# Patient Record
Sex: MALE | Race: WHITE | NOT HISPANIC OR LATINO | Employment: OTHER | URBAN - METROPOLITAN AREA
[De-identification: names, ages, dates, MRNs, and addresses within clinical notes are randomized per-mention and may not be internally consistent; named-entity substitution may affect disease eponyms.]

---
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
## 2025-01-06 ENCOUNTER — PRE-ADMISSION TESTING (OUTPATIENT)
Dept: PREADMISSION TESTING | Facility: HOSPITAL | Age: 20
End: 2025-01-06
Payer: COMMERCIAL

## 2025-02-03 DIAGNOSIS — K02.61 DENTAL CARIES ON SMOOTH SURFACE LIMITED TO ENAMEL: Primary | ICD-10-CM

## 2025-02-05 ENCOUNTER — APPOINTMENT (OUTPATIENT)
Dept: BEHAVIORAL HEALTH | Facility: CLINIC | Age: 20
End: 2025-02-05
Payer: COMMERCIAL

## 2025-02-12 ENCOUNTER — APPOINTMENT (OUTPATIENT)
Dept: BEHAVIORAL HEALTH | Facility: CLINIC | Age: 20
End: 2025-02-12
Payer: COMMERCIAL

## 2025-02-12 DIAGNOSIS — T88.7XXA MEDICATION SIDE EFFECT: ICD-10-CM

## 2025-02-12 DIAGNOSIS — F41.1 GAD (GENERALIZED ANXIETY DISORDER): ICD-10-CM

## 2025-02-12 DIAGNOSIS — F84.0 AUTISM SPECTRUM DISORDER (HHS-HCC): Primary | ICD-10-CM

## 2025-02-12 PROCEDURE — 99214 OFFICE O/P EST MOD 30 MIN: CPT | Performed by: PSYCHIATRY & NEUROLOGY

## 2025-02-12 RX ORDER — METFORMIN HYDROCHLORIDE 500 MG/1
500 TABLET ORAL
Qty: 30 TABLET | Refills: 3 | Status: SHIPPED | OUTPATIENT
Start: 2025-02-12 | End: 2025-06-12

## 2025-02-12 RX ORDER — BUSPIRONE HYDROCHLORIDE 30 MG/1
15 TABLET ORAL 3 TIMES DAILY
Qty: 45 TABLET | Refills: 3 | Status: SHIPPED | OUTPATIENT
Start: 2025-02-12 | End: 2025-06-12

## 2025-02-12 NOTE — PROGRESS NOTES
"Outpatient Child and Adolescent Psychiatry      Subjective   Jamar Abdalla, a 19 y.o. male, for medication management follow up. Patient seen virtually, accompanied by father.    Chief Complaint:  Chief Complaint   Patient presents with    Anxiety    Autism        HPI:     Since last visit, Jamar reports he has been fine and dad agrees. Jamar likes working at the [employer] with dad, which did not have to move after all. He likes spending time at the barn. Continues to use curse words in the course of daily conversation. Mood has been, \"Pretty good; in the past few months, I've only blown up twice.\" Once, it was because their delivery pizza did not show up on time and he can't recall the other trigger. Very hungry. Eats fast food regularly. Generally able to handle frustration better. Jamar will get wisdom teeth out in a few weeks. Sees PMD regularly. Aside from robust appetite, no side effects. No SI or HI.     Wt: 220#  Ht: 71\"     Past med trials:  concerta: helpful but may have worsened irritability  Sertraline seemed helpful at first, but caused activation, aggression at higher doses  tenex worsened aggression and oppositionality  ritalin worked then stopped  clonidine \"knocked him out, he ran into walls and fell asleep on his feet\"  never tried seroquel, abilify     Depression: Denies   Appetite: robust  Sleep: unchanged  Anxiety: Denies    Rosanna: None  Attention: fair  Impulse control and behavioral concerns: improving  Trauma/Stressors: Denies  OCD: Denies  Perceptual disturbances and delusions: Denies  Substance use: Denies  Denies suicidal or homicidal ideations, plan or intent    There were no vitals filed for this visit.     Mental Status Exam:  Appearance: 19 y.o. male sitting comfortably in chair during interview. Casually dressed. Appropriate hygiene and grooming. Appears heavier than last visit.   Behavior: Calm, cooperative, talkative, difficult to interrupt, swears regularly, intermittent eye " "contact. No abnormal motor activity observed.  Speech: Loud volume, halting prosody.   Cognitive: Sustains attention and conversation throughout interview; grossly oriented to time, self, place, and situation; recent and remote recall are intact  Mood: “It's fine, except when people piss me off\"  Affect: Euthymic   Thought process: concrete  Thought Content: No suicidal ideation/intent/plan. No homicidal ideation/intent/plan.  Perception: Denies auditory and visual hallucinations. No internal stimulation observed. Reality testing is ostensibly intact during interview.  Insight: fair  Judgment: fair    Current Medications:    Current Outpatient Medications:     busPIRone (Buspar) 30 mg tablet, Take 0.5 tablets (15 mg) by mouth 3 times a day., Disp: 45 tablet, Rfl: 3    ferrous sulfate, 325 mg ferrous sulfate, tablet, Take 1 tablet by mouth once daily with breakfast., Disp: , Rfl:     hydrOXYzine HCL (Atarax) 10 mg tablet, TAKE 1 TABLET 2-3 TIMES DAILY AS NEEDED FOR PANIC, Disp: 270 tablet, Rfl: 1    hydrOXYzine HCL (Atarax) 25 mg tablet, TAKE 1 TABLET (25 MG) BY MOUTH 3 TIMES A DAY AS NEEDED FOR ANXIETY., Disp: 270 tablet, Rfl: 1    metFORMIN (Glucophage) 500 mg tablet, Take 1 tablet (500 mg) by mouth once daily in the evening. Take with meals. Take with food., Disp: 30 tablet, Rfl: 3    omeprazole (PriLOSEC) 20 mg DR capsule, TAKE 1 CAPSULE BY MOUTH EVERY DAY, Disp: 90 capsule, Rfl: 1    risperiDONE (RisperDAL) 1 mg tablet, Take 1 tablet (1 mg) by mouth 2 times a day., Disp: 180 tablet, Rfl: 1      Assessment/Plan   Diagnosis:  Problem List Items Addressed This Visit             ICD-10-CM    Autism spectrum disorder ([facility]) - Primary F84.0    Relevant Orders    Follow Up In Pediatric Psychiatry    ANA (generalized anxiety disorder) F41.1    Relevant Medications    busPIRone (Buspar) 30 mg tablet    Other Relevant Orders    Follow Up In Pediatric Psychiatry     Other Visit Diagnoses         Codes    Medication side " effect     T88.7XXA    Relevant Medications    metFORMIN (Glucophage) 500 mg tablet           Treatment Plan/Recommendations:     1) Discussed options and decided to decrease risperidone to 0.5mg each morning and 1 mg each afternoon for irritability, aggression since you are doing well and your appetite remains robust; reordered labs  2) Continue metformin 500 mg once daily (higher dose caused GI distress)   3) Continue buspirone 15 mg three times daily for anxiety  4) Continue hydroxyzine 25 mg twice daily as needed for anxiety  5) Continue melatonin er 2.5-5mg as needed for insomnia, increase daytime exercise  6) Continue psychosocial supports, CSS supports, activities through [facility]  7) Nice to see you and please come back in 3-4 months    Follow-up plan for psychiatric condition was discussed with patient and family  Take medication as prescribed; risks, benefits and alternatives of medication were explained, including but not limited to changes in mood, sleep, appetite, increased risks of suicidal ideations, etc. Family and patient verbalized understanding and provided verbal consent for treatment  Therapy: Continue therapy services  Call 911 or go to the nearest emergency room should suicidal ideations emerge  Patient instructed to call the office should new questions or concerns arise after office visit    Safety Risk Assessment:   Acute risk for harm to self/others: low  Chronic risk for harm to self/others: low    Problem List Items Addressed This Visit       Autism spectrum disorder ([facility]) - Primary    Relevant Orders    Follow Up In Pediatric Psychiatry    ANA (generalized anxiety disorder)    Relevant Medications    busPIRone (Buspar) 30 mg tablet    Other Relevant Orders    Follow Up In Pediatric Psychiatry     Other Visit Diagnoses       Medication side effect        Relevant Medications    metFORMIN (Glucophage) 500 mg tablet             Follow-up:    Dulce Baugh MD

## 2025-02-18 ENCOUNTER — ANESTHESIA EVENT (OUTPATIENT)
Dept: OPERATING ROOM | Facility: CLINIC | Age: 20
End: 2025-02-18
Payer: COMMERCIAL

## 2025-02-21 ENCOUNTER — PRE-ADMISSION TESTING (OUTPATIENT)
Dept: PREADMISSION TESTING | Facility: HOSPITAL | Age: 20
End: 2025-02-21
Payer: COMMERCIAL

## 2025-02-21 VITALS
OXYGEN SATURATION: 99 % | TEMPERATURE: 97.5 F | SYSTOLIC BLOOD PRESSURE: 133 MMHG | DIASTOLIC BLOOD PRESSURE: 81 MMHG | BODY MASS INDEX: 29.61 KG/M2 | WEIGHT: 218.6 LBS | HEART RATE: 83 BPM | HEIGHT: 72 IN

## 2025-02-21 DIAGNOSIS — R25.1 TREMOR: Primary | ICD-10-CM

## 2025-02-21 PROCEDURE — 99204 OFFICE O/P NEW MOD 45 MIN: CPT | Performed by: NURSE PRACTITIONER

## 2025-02-21 ASSESSMENT — DUKE ACTIVITY SCORE INDEX (DASI)
CAN YOU WALK INDOORS, SUCH AS AROUND YOUR HOUSE: YES
CAN YOU TAKE CARE OF YOURSELF (EAT, DRESS, BATHE, OR USE TOILET): YES
TOTAL_SCORE: 58.2
CAN YOU CLIMB A FLIGHT OF STAIRS OR WALK UP A HILL: YES
CAN YOU RUN A SHORT DISTANCE: YES
CAN YOU DO HEAVY WORK AROUND THE HOUSE LIKE SCRUBBING FLOORS OR LIFTING AND MOVING HEAVY FURNITURE: YES
CAN YOU DO MODERATE WORK AROUND THE HOUSE LIKE VACUUMING, SWEEPING FLOORS OR CARRYING GROCERIES: YES
DASI METS SCORE: 9.9
CAN YOU WALK A BLOCK OR TWO ON LEVEL GROUND: YES
CAN YOU HAVE SEXUAL RELATIONS: YES
CAN YOU DO YARD WORK LIKE RAKING LEAVES, WEEDING OR PUSHING A MOWER: YES
CAN YOU DO LIGHT WORK AROUND THE HOUSE LIKE DUSTING OR WASHING DISHES: YES
CAN YOU PARTICIPATE IN STRENOUS SPORTS LIKE SWIMMING, SINGLES TENNIS, FOOTBALL, BASKETBALL, OR SKIING: YES
CAN YOU PARTICIPATE IN MODERATE RECREATIONAL ACTIVITIES LIKE GOLF, BOWLING, DANCING, DOUBLES TENNIS OR THROWING A BASEBALL OR FOOTBALL: YES

## 2025-02-21 ASSESSMENT — ENCOUNTER SYMPTOMS
EYES NEGATIVE: 1
AGITATION: 1
NEUROLOGICAL NEGATIVE: 1
MUSCULOSKELETAL NEGATIVE: 1
CARDIOVASCULAR NEGATIVE: 1
RESPIRATORY NEGATIVE: 1
CONSTITUTIONAL NEGATIVE: 1
ENDOCRINE NEGATIVE: 1
GASTROINTESTINAL NEGATIVE: 1
NECK NEGATIVE: 1

## 2025-02-21 ASSESSMENT — LIFESTYLE VARIABLES: SMOKING_STATUS: NONSMOKER

## 2025-02-21 NOTE — CPM/PAT H&P
CPM/PAT Evaluation       Name: Jamar Abdalla (Jamar Abdalla)  /Age: 2005/ y.o.     Visit Type:   In-Person       Chief Complaint: perioperative evaluation      HPI  The patient is a 19 year old male with history of autism and ANA who presents for perioperative evaluation in anticipation of  EXTRACTION, TOOTH - Bilateral on 25 with Dr. Morales.    Past Medical History:   Diagnosis Date    Anxiety     Attention-deficit hyperactivity disorder, combined type     ADHD (attention deficit hyperactivity disorder), combined type    Crushing injury of unspecified finger(s), initial encounter 2014    Crush injury to finger    Depression     Developmental delay     Dysphagia, unspecified 2016    Swallowing difficulty    Encounter for removal of sutures 2018    Encounter for removal of sutures    Encounter for routine child health examination without abnormal findings 2015    Well child visit    Gastroparesis 2016    Gastroparesis    Other feeding difficulties 2017    Feeding difficulty in child    Personal history of diseases of the blood and blood-forming organs and certain disorders involving the immune mechanism 2016    History of iron deficiency anemia    Personal history of diseases of the blood and blood-forming organs and certain disorders involving the immune mechanism     History of anemia    Personal history of other diseases of the respiratory system     History of pharyngitis    Personal history of other diseases of the respiratory system     History of streptococcal pharyngitis    Pervasive developmental disorder, unspecified     PDD (pervasive developmental disorder)       Past Surgical History:   Procedure Laterality Date    NO PAST SURGERIES         Patient  has no history on file for sexual activity.    Family History   Problem Relation Name Age of Onset    Bipolar disorder Mother Shakila Abdalla     Irritable bowel syndrome Mother Shakila Abdalla      Sleep apnea Mother Shakila Abdalla     Asthma Mother Shakila Abdalla     Cancer Mother Shakila Abdalla     Mental illness Mother Shakila Abdalla     Miscarriages / Stillbirths Mother Shakila Abdalla     Sarcoidosis Father Jordi Abdalla     Scoliosis Father Jordi Abdalla     Other (PITUITARY TUMOR) Father Jordi Abdalla     Atrial fibrillation Father Jordi Abdalla     Diabetes Father Jordi Abdalla     Heart disease Father Jordi Abdalla     Other (SUBSTANCE ABUSE) Brother Ben Abdalla     Drug abuse Brother Ben Abdalla     Diabetes Other GRANDFATHER     Anemia Other GRANDFATHER        Allergies   Allergen Reactions    Egg Other     BLOATING      Sertraline Other     aggression    Whey Protein Concentrate Other     BLOATING       Prior to Admission medications    Medication Sig Start Date End Date Taking? Authorizing Provider   busPIRone (Buspar) 30 mg tablet Take 0.5 tablets (15 mg) by mouth 3 times a day. 2/12/25 6/12/25  Dulce Baugh MD   ferrous sulfate, 325 mg ferrous sulfate, tablet Take 1 tablet by mouth once daily with breakfast.    Historical Provider, MD   hydrOXYzine HCL (Atarax) 10 mg tablet TAKE 1 TABLET 2-3 TIMES DAILY AS NEEDED FOR PANIC 12/18/23   Dulce Baugh MD   hydrOXYzine HCL (Atarax) 25 mg tablet TAKE 1 TABLET (25 MG) BY MOUTH 3 TIMES A DAY AS NEEDED FOR ANXIETY. 9/13/24 3/12/25  Dulce Baugh MD   metFORMIN (Glucophage) 500 mg tablet Take 1 tablet (500 mg) by mouth once daily in the evening. Take with meals. Take with food. 2/12/25 6/12/25  Dulce Baugh MD   omeprazole (PriLOSEC) 20 mg DR capsule TAKE 1 CAPSULE BY MOUTH EVERY DAY 12/16/24   John Marsh MD   risperiDONE (RisperDAL) 1 mg tablet Take 1 tablet (1 mg) by mouth 2 times a day. 9/25/24 3/24/25  Dulce Baugh MD        PAT ROS:   Constitutional:   neg    Neuro/Psych:   neg     agitation  Eyes:   neg    Ears:   neg    Nose:   neg    Mouth:   neg     mouth pain  Throat:   neg    Neck:   neg     Cardio:   neg    Respiratory:   neg    Endocrine:   neg    GI:   neg    :   neg    Musculoskeletal:   neg    Hematologic:   neg    Skin:  neg        Physical Exam  Vitals reviewed.   Constitutional:       Appearance: Normal appearance.   HENT:      Head: Normocephalic and atraumatic.      Nose: Nose normal.      Mouth/Throat:      Mouth: Mucous membranes are moist.      Pharynx: Oropharynx is clear.   Eyes:      Extraocular Movements: Extraocular movements intact.      Pupils: Pupils are equal, round, and reactive to light.   Cardiovascular:      Rate and Rhythm: Normal rate and regular rhythm.      Pulses: Normal pulses.      Heart sounds: Normal heart sounds.   Pulmonary:      Effort: Pulmonary effort is normal.      Breath sounds: Normal breath sounds.   Musculoskeletal:         General: Normal range of motion.      Cervical back: Normal range of motion.   Skin:     General: Skin is warm and dry.   Neurological:      General: No focal deficit present.      Mental Status: He is alert and oriented to person, place, and time.   Psychiatric:         Mood and Affect: Mood normal.      Comments: Easily agitated          PAT AIRWAY:   Airway:     Mallampati::  IV    TM distance::  >3 FB    Neck ROM::  Full  normal        Visit Vitals  /81   Pulse 83   Temp 36.4 °C (97.5 °F)   Ht 1.829 m (6')   Wt 99.2 kg (218 lb 9.6 oz)   SpO2 99%   BMI 29.65 kg/m²   Smoking Status Never   BSA 2.24 m²       DASI Risk Score      Flowsheet Row Pre-Admission Testing from 2/21/2025 in [facility] Questionnaire Series Submission from 12/30/2024 in [facility] with Generic Provider Azalia   Can you take care of yourself (eat, dress, bathe, or use toilet)?  2.75 filed at 02/21/2025 0833 2.75  filed at 12/30/2024 1019   Can you walk indoors, such as around your house? 1.75 filed at 02/21/2025 0833 1.75  filed at 12/30/2024 1019   Can you walk a block or two on level ground?  2.75 filed at 02/21/2025 0833 2.75  filed  at 12/30/2024 1019   Can you climb a flight of stairs or walk up a hill? 5.5 filed at 02/21/2025 0833 5.5  filed at 12/30/2024 1019   Can you run a short distance? 8 filed at 02/21/2025 0833 8  filed at 12/30/2024 1019   Can you do light work around the house like dusting or washing dishes? 2.7 filed at 02/21/2025 0833 2.7  filed at 12/30/2024 1019   Can you do moderate work around the house like vacuuming, sweeping floors or carrying groceries? 3.5 filed at 02/21/2025 0833 3.5  filed at 12/30/2024 1019   Can you do heavy work around the house like scrubbing floors or lifting and moving heavy furniture?  8 filed at 02/21/2025 0833 8  filed at 12/30/2024 1019   Can you do yard work like raking leaves, weeding or pushing a mower? 4.5 filed at 02/21/2025 0833 4.5  filed at 12/30/2024 1019   Can you have sexual relations? 5.25 filed at 02/21/2025 0833 5.25  filed at 12/30/2024 1019   Can you participate in moderate recreational activities like golf, bowling, dancing, doubles tennis or throwing a baseball or football? 6 filed at 02/21/2025 0833 6  filed at 12/30/2024 1019   Can you participate in strenous sports like swimming, singles tennis, football, basketball, or skiing? 7.5 filed at 02/21/2025 0833 7.5  filed at 12/30/2024 1019   DASI SCORE 58.2 filed at 02/21/2025 0833 58.2  filed at 12/30/2024 1019   METS Score (Will be calculated only when all the questions are answered) 9.9 filed at 02/21/2025 0833 9.9  filed at 12/30/2024 1019          Capterreneci DVT Assessment      Flowsheet Row Pre-Admission Testing from 2/21/2025 in [facility]   DVT Score (IF A SCORE IS NOT CALCULATING, MUST SELECT A BMI TO COMPLETE) 2 filed at 02/21/2025 0857   Surgical Factors Minor surgery planned filed at 02/21/2025 0857   BMI (BMI MUST BE CHOSEN) 30 or less filed at 02/21/2025 0857          Modified Frailty Index      Flowsheet Row Pre-Admission Testing from 2/21/2025 in [facility]   Non-independent  functional status (problems with dressing, bathing, personal grooming, or cooking) 0 filed at 02/21/2025 0856   History of diabetes mellitus  0 filed at 02/21/2025 0856   History of COPD 0 filed at 02/21/2025 0856   History of CHF No filed at 02/21/2025 0856   History of MI 0 filed at 02/21/2025 0856   History of Percutaneous Coronary Intervention, Cardiac Surgery, or Angina No filed at 02/21/2025 0856   Hypertension requiring the use of medication  0 filed at 02/21/2025 0856   Peripheral vascular disease 0 filed at 02/21/2025 0856   Impaired sensorium (cognitive impairement or loss, clouding, or delirium) 0 filed at 02/21/2025 0856   TIA or CVA withouy residual deficit 0 filed at 02/21/2025 0856   Cerebrovascular accident with deficit 0 filed at 02/21/2025 0856   Modified Frailty Index Calculator 0 filed at 02/21/2025 0856          CHADS2 Stroke Risk         N/A 3 to 100%: High Risk   2 to < 3%: Medium Risk   0 to < 2%: Low Risk     Last Change: N/A          This score determines the patient's risk of having a stroke if the patient has atrial fibrillation.        This score is not applicable to this patient. Components are not calculated.          Revised Cardiac Risk Index      Flowsheet Row Pre-Admission Testing from 2/21/2025 in [facility]   High-Risk Surgery (Intraperitoneal, Intrathoracic,Suprainguinal vascular) 0 filed at 02/21/2025 0856   History of ischemic heart disease (History of MI, History of positive exercuse test, Current chest paint considered due to myocardial ischemia, Use of nitrate therapy, ECG with pathological Q Waves) 0 filed at 02/21/2025 0856   History of congestive heart failure (pulmonary edemia, bilateral rales or S3 gallop, Paroxysmal nocturnal dyspnea, CXR showing pulmonary vascular redistribution) 0 filed at 02/21/2025 0856   History of cerebrovascular disease (Prior TIA or stroke) 0 filed at 02/21/2025 0856   Pre-operative insulin treatment 0 filed at 02/21/2025 0856    Pre-operative creatinine>2 mg/dl 0 filed at 02/21/2025 0856   Revised Cardiac Risk Calculator 0 filed at 02/21/2025 0856          Apfel Simplified Score      Flowsheet Row Pre-Admission Testing from 2/21/2025 in [facility]   Smoking status 1 filed at 02/21/2025 0857   History of motion sickness or PONV  0 filed at 02/21/2025 0857   Use of postoperative opioids 1 filed at 02/21/2025 0857   Gender - Female 0=No filed at 02/21/2025 0857   Apfel Simplified Score Calculator 2 filed at 02/21/2025 0857          Risk Analysis Index Results This Encounter    No data found in the last 10 encounters.       Stop Bang Score      Flowsheet Row Pre-Admission Testing from 2/21/2025 in [facility] Questionnaire Series Submission [facility]30[facility] with Generic Provider Azalia   Do you snore loudly? 0 filed at 02/21/2025 0833 0  filed at 12/30/2024 1019   Do you often feel tired or fatigued after your sleep? 0 filed at 02/21/2025 0833 0  filed at 12/30/2024 1019   Has anyone ever observed you stop breathing in your sleep? 0 filed at 02/21/2025 0833 0  filed at 12/30/2024 1019   Do you have or are you being treated for high blood pressure? 0 filed at 02/21/2025 0833 0  filed at 12/30/2024 1019   Recent BMI (Calculated) 28.7 filed at 02/21/2025 0833 28.7  filed at 12/30/2024 1019   Is BMI greater than 35 kg/m2? 0=No filed at 02/21/2025 0833 0=No  filed at 12/30/2024 1019   Age older than 50 years old? 0=No filed at 02/21/2025 0833 0=No  filed at 12/30/2024 1019   Is your neck circumference greater than 17 inches (Male) or 16 inches (Female)? 0 filed at 02/21/2025 0833 --   Gender - Male 1=Yes filed at 02/21/2025 0833 1=Yes  filed at 12/30/2024 1019   STOP-BANG Total Score 1 filed at 02/21/2025 0833 --          Prodigy: High Risk  Total Score: 8              Prodigy Gender Score          ARISCAT Score for Postoperative Pulmonary Complications      Flowsheet Row Pre-Admission Testing from  2/21/2025 in [facility]   Age Calculated Score 0 filed at 02/21/2025 0857   Preoperative SpO2 0 filed at 02/21/2025 0857   Respiratory infection in the last month Either upper or lower (i.e., URI, bronchitis, pneumonia), with fever and antibiotic treatment 0 filed at 02/21/2025 0857   Preoperative anemia (Hgb less than 10 g/dl) 0 filed at 02/21/2025 0857   Surgical incision  0 filed at 02/21/2025 0857   Duration of surgery  0 filed at 02/21/2025 0857   Emergency Procedure  0 filed at 02/21/2025 0857   ARISCAT Total Score  0 filed at 02/21/2025 0857          Enrique Perioperative Risk for Myocardial Infarction or Cardiac Arrest (DEREK)      Flowsheet Row Pre-Admission Testing from 2/21/2025 in [facility]   Calculated Age Score 0.38 filed at 02/21/2025 0857   Functional Status  0 filed at 02/21/2025 0857   ASA Class  -5.17 filed at 02/21/2025 0857   Creatinine 0 filed at 02/21/2025 0857          No results found for this or any previous visit (from the past 4 weeks).     Diagnostic Results    ** ** ** ** * Pediatric ECG Analysis * ** ** ** **  Normal sinus rhythm  Normal ECG  When compared with ECG of 13-APR-2017 15:07, No significant change was found    Assessment and Plan:     Anesthesia  The patient has no prior exposure to general anesthesia.     Neurology  The patient has anxiety, autism managed on medications, followed by behavioral health.    HEENT/Airway  No diagnoses, significant findings on chart review, clinical presentation, or evaluation. No documented or reported history of airway difficulty.     Cardiovascular  No diagnoses or significant findings on chart review or clinical presentation and evaluation.    METS  The patient's functional capacity is greater than 4 METS.  RCRI  The patient meets 0 RCRI criteria and therefor has a 3.9% risk of major adverse cardiac complications.  DEREK score which indicates a 0% risk of intraoperative or 30-day postoperative MACE (major  adverse cardiac event).    Cardiology Evaluation  The patient is not followed by cardiology.    He is scheduled for a low risk procedure. Therefore, no further cardiac evaluation is indicated.    Pulmonary  No significant findings on chart review or clinical presentation and evaluation.    STOP BANG 1, which places patient at low risk for having GABRIELLE.  ARISCAT 0, low, 1.6% risk of in-hospital postoperative pulmonary complications  PRODIGY 8, intermediate risk of respiratory depression episode.     Patient given PI sheet for preoperative deep breathing exercises.  Encourage  incentive spirometry in the postoperative period as deemed necessary.    Endocrine  No diagnoses or significant findings on chart review or clinical presentation and evaluation.    Gastrointestinal  The patient has GERD managed on omeprazole, instructed to continue as prescribed in the perioperative period    Eat 10- 0,  self-perceived oropharyngeal dysphagia scale (0-40)     Genitourinary  No diagnoses or significant findings on chart review or clinical presentation and evaluation.    Renal  No renal diagnoses or significant findings on chart review or clinical presentation and evaluation. The patient has specific risk factors associated with increased risk of perioperative renal complications related to male gender.    Hematology  No diagnoses or significant findings on chart review or clinical presentation and evaluation.    Caprini score 2, low risk of perioperative VTE.     Patient instructed to ambulate as soon as possible postoperatively to decrease thromboembolic risk. Initiate mechanical DVT prophylaxis as soon as possible and initiate chemical prophylaxis when deemed safe from a bleeding standpoint post surgery.     Transfusion Evaluation  T&S not obtained. Low likelihood for perioperative transfusion of blood or blood products.    Musculoskeletal  No diagnoses or significant findings on chart review or clinical presentation and  evaluation.    ID  No diagnoses or significant findings on chart review or clinical presentation and evaluation.    -Preoperative medication instructions were provided and reviewed with the patient.  Any additional testing or evaluation was explained to the patient.  NPO Instructions were discussed, and the patient's questions were answered prior to conclusion of this encounter. Patient verbalized understanding of preoperative instructions. After Visit Summary given.

## 2025-02-21 NOTE — PREPROCEDURE INSTRUCTIONS
Fasting Guidelines    NPO Instructions:    Do not eat any food after midnight the night before your surgery/procedure.  You may have up to 13.5 ounces of clear liquids until TWO hours before your instructed arrival time to the hospital. This includes water, black tea/coffee, (no milk or cream), apple juice, and/or electrolyte drinks (Gatorade).  You may chew gum up to TWO hours before your surgery/procedure.    Additional Instructions:    Avoid herbal supplements, multivitamins and NSAIDS (non-steroidal anti-inflammatory drugs) such as Advil, Aleve, Ibuprofen, Naproxen, Excedrin, Meloxicam or Celebrex for at least 7 days prior to surgery. May take Tylenol as needed.    Avoid tobacco and alcohol products for 24 hours prior to surgery.    CONTACT SURGEON'S OFFICE IF YOU DEVELOP:  * Fever = 100.4 F   * New respiratory symptoms (e.g. cough, shortness of breath, respiratory distress, sore throat)  * Recent loss of taste or smell  *Flu like symptoms such as headache, fatigue or gastrointestinal symptoms  * You develop any open sores, shingles, burning or painful urination   AND/OR:  * You no longer wish to have the surgery.  * Any other personal circumstances change that may lead to the need to cancel or defer this surgery.  *You were admitted to any hospital within one week of your planned procedure.    Seven/Six Days before Surgery:  Review your medication instructions, stop indicated medications    Day of Surgery:  Review your medication instructions, take indicated medications  Wear comfortable loose fitting clothing  Do not use moisturizers, creams, lotions or perfume  All jewelry and valuables should be left at home    [facility]  [facility]  Nuscz-[phone]  [phone]  [email or web address]      Preoperative Brain Exercises    What are brain exercises?  A brain exercise is any activity that engages your thinking (cognitive) skills.    What types of  activities are considered brain exercises?  Jigsaw puzzles, crossword puzzles, word jumble, memory games, word search, and many more.  Many can be found free online or on your phone via a mobile sebas.    Why should I do brain exercises before my surgery?  More recent research has shown brain exercise before surgery can lower the risk of postoperative delirium (confusion) which can be especially important for older adults.  Patients who did brain exercises for 5 to 10 hours the days before surgery, cut their risk of postoperative delirium in half up to 1 week after surgery.         [facility]    Preoperative Deep Breathing Exercises    Why it is important to do deep breathing exercises before my surgery?  Deep breathing exercises strengthen your breathing muscles.  This helps you to recover after your surgery and decreases the chance of breathing complications.      How are the deep breathing exercises done?  Sit straight with your back supported.  Breathe in deeply and slowly through your nose. Your lower rib cage should expand and your abdomen may move forward.  Hold that breath for 3 to 5 seconds.  Breathe out through pursed lips, slowly and completely.  Rest and repeat 10 times every hour while awake.  Rest longer if you become dizzy or lightheaded.         Patient and Family Education             Ways You Can Help Prevent Blood Clots             This handout explains some simple things you can do to help prevent blood clots.      Blood clots are blockages that can form in the body's veins. When a blood clot forms in your deep veins, it may be called a deep vein thrombosis, or DVT for short. Blood clots can happen in any part of the body where blood flows, but they are most common in the arms and legs. If a piece of a blood clot breaks free and travels to the lungs, it is called a pulmonary embolus (PE). A PE can be a very serious problem.         Being in the hospital or having surgery  can raise your chances of getting a blood clot because you may not be well enough to move around as much as you normally do.         Ways you can help prevent blood clots in the hospital         Wearing SCDs. SCDs stands for Sequential Compression Devices.   SCDs are special sleeves that wrap around your legs  They attach to a pump that fills them with air to gently squeeze your legs every few minutes.   This helps return the blood in your legs to your heart.   SCDs should only be taken off when walking or bathing.   SCDs may not be comfortable, but they can help save your life.               Wearing compression stockings - if your doctor orders them. These special snug fitting stockings gently squeeze your legs to help blood flow.       Walking. Walking helps move the blood in your legs.   If your doctor says it is ok, try walking the halls at least   5 times a day. Ask us to help you get up, so you don't fall.      Taking any blood thinning medicines your doctor orders.        Page 1 of 2     [facility]; 3/23   Ways you can help prevent blood clots at home       Wearing compression stockings - if your doctor orders them. ? Walking - to help move the blood in your legs.       Taking any blood thinning medicines your doctor orders.      Signs of a blood clot or PE      Tell your doctor or nurse know right away if you have of the problems listed below.    If you are at home, seek medical care right away. Call 911 for chest pain or problems breathing.               Signs of a blood clot (DVT) - such as pain,  swelling, redness or warmth in your arm or leg      Signs of a pulmonary embolism (PE) - such as chest     pain or feeling short of breath

## 2025-02-24 ENCOUNTER — HOSPITAL ENCOUNTER (OUTPATIENT)
Facility: CLINIC | Age: 20
Setting detail: OUTPATIENT SURGERY
Discharge: HOME | End: 2025-02-24
Attending: DENTIST | Admitting: DENTIST
Payer: COMMERCIAL

## 2025-02-24 ENCOUNTER — ANESTHESIA (OUTPATIENT)
Dept: OPERATING ROOM | Facility: CLINIC | Age: 20
End: 2025-02-24
Payer: COMMERCIAL

## 2025-02-24 VITALS
BODY MASS INDEX: 29.38 KG/M2 | RESPIRATION RATE: 17 BRPM | OXYGEN SATURATION: 96 % | TEMPERATURE: 97.9 F | HEIGHT: 72 IN | DIASTOLIC BLOOD PRESSURE: 68 MMHG | HEART RATE: 77 BPM | WEIGHT: 216.93 LBS | SYSTOLIC BLOOD PRESSURE: 115 MMHG

## 2025-02-24 DIAGNOSIS — R46.89 BEHAVIOR CONCERN: ICD-10-CM

## 2025-02-24 DIAGNOSIS — G47.9 DIFFICULTY SLEEPING: ICD-10-CM

## 2025-02-24 DIAGNOSIS — F41.1 GAD (GENERALIZED ANXIETY DISORDER): ICD-10-CM

## 2025-02-24 DIAGNOSIS — R55 SYNCOPE AND COLLAPSE: ICD-10-CM

## 2025-02-24 DIAGNOSIS — R55 VASODEPRESSOR SYNCOPE: ICD-10-CM

## 2025-02-24 DIAGNOSIS — G25.9 EXTRAPYRAMIDAL AND MOVEMENT DISORDER, UNSPECIFIED: ICD-10-CM

## 2025-02-24 DIAGNOSIS — G93.89 CEREBRAL VENTRICULOMEGALY: ICD-10-CM

## 2025-02-24 DIAGNOSIS — F84.9 PDD (PERVASIVE DEVELOPMENTAL DISORDER) (HHS-HCC): ICD-10-CM

## 2025-02-24 DIAGNOSIS — F84.0 AUTISM SPECTRUM DISORDER (HHS-HCC): ICD-10-CM

## 2025-02-24 DIAGNOSIS — K01.1 IMPACTED TOOTH: Primary | ICD-10-CM

## 2025-02-24 DIAGNOSIS — R40.4 STARING EPISODES: ICD-10-CM

## 2025-02-24 DIAGNOSIS — F70 MILD INTELLECTUAL DISABILITY: ICD-10-CM

## 2025-02-24 DIAGNOSIS — M43.6 LEFT TORTICOLLIS: ICD-10-CM

## 2025-02-24 DIAGNOSIS — M24.50 CONTRACTED, JOINT: ICD-10-CM

## 2025-02-24 DIAGNOSIS — G24.9 DYSTONIA: ICD-10-CM

## 2025-02-24 DIAGNOSIS — R10.9 ABDOMINAL CRAMPING: ICD-10-CM

## 2025-02-24 DIAGNOSIS — F80.81 STUTTERING: ICD-10-CM

## 2025-02-24 DIAGNOSIS — R44.3 HALLUCINATION: ICD-10-CM

## 2025-02-24 DIAGNOSIS — Q04.8 DYSGENESIS OF CORPUS CALLOSUM (MULTI): ICD-10-CM

## 2025-02-24 DIAGNOSIS — R25.1 TREMOR: ICD-10-CM

## 2025-02-24 DIAGNOSIS — F80.1 EXPRESSIVE LANGUAGE DELAY: ICD-10-CM

## 2025-02-24 DIAGNOSIS — F82 DEVELOPMENTAL COORDINATION DISORDER: ICD-10-CM

## 2025-02-24 DIAGNOSIS — R29.898 HYPOTONIA: ICD-10-CM

## 2025-02-24 DIAGNOSIS — R46.89 AGGRESSION: ICD-10-CM

## 2025-02-24 DIAGNOSIS — M41.9 MILD SCOLIOSIS: ICD-10-CM

## 2025-02-24 DIAGNOSIS — F95.1 CHRONIC MOTOR TIC DISORDER: ICD-10-CM

## 2025-02-24 DIAGNOSIS — R51.9 FREQUENT HEADACHES: ICD-10-CM

## 2025-02-24 PROCEDURE — 3600000002 HC OR TIME - INITIAL BASE CHARGE - PROCEDURE LEVEL TWO: Performed by: DENTIST

## 2025-02-24 PROCEDURE — 3700000001 HC GENERAL ANESTHESIA TIME - INITIAL BASE CHARGE: Performed by: DENTIST

## 2025-02-24 PROCEDURE — 2500000001 HC RX 250 WO HCPCS SELF ADMINISTERED DRUGS (ALT 637 FOR MEDICARE OP): Performed by: ANESTHESIOLOGY

## 2025-02-24 PROCEDURE — A41899 PR DENTAL SURGERY PROCEDURE: Performed by: ANESTHESIOLOGY

## 2025-02-24 PROCEDURE — A41899 PR DENTAL SURGERY PROCEDURE

## 2025-02-24 PROCEDURE — 7100000009 HC PHASE TWO TIME - INITIAL BASE CHARGE: Performed by: DENTIST

## 2025-02-24 PROCEDURE — 7100000001 HC RECOVERY ROOM TIME - INITIAL BASE CHARGE: Performed by: DENTIST

## 2025-02-24 PROCEDURE — 7100000002 HC RECOVERY ROOM TIME - EACH INCREMENTAL 1 MINUTE: Performed by: DENTIST

## 2025-02-24 PROCEDURE — 2500000004 HC RX 250 GENERAL PHARMACY W/ HCPCS (ALT 636 FOR OP/ED)

## 2025-02-24 PROCEDURE — 3600000007 HC OR TIME - EACH INCREMENTAL 1 MINUTE - PROCEDURE LEVEL TWO: Performed by: DENTIST

## 2025-02-24 PROCEDURE — 2500000004 HC RX 250 GENERAL PHARMACY W/ HCPCS (ALT 636 FOR OP/ED): Performed by: DENTIST

## 2025-02-24 PROCEDURE — 3700000002 HC GENERAL ANESTHESIA TIME - EACH INCREMENTAL 1 MINUTE: Performed by: DENTIST

## 2025-02-24 PROCEDURE — 7100000010 HC PHASE TWO TIME - EACH INCREMENTAL 1 MINUTE: Performed by: DENTIST

## 2025-02-24 PROCEDURE — 2500000005 HC RX 250 GENERAL PHARMACY W/O HCPCS

## 2025-02-24 RX ORDER — BUPIVACAINE HYDROCHLORIDE 5 MG/ML
INJECTION, SOLUTION EPIDURAL; INTRACAUDAL AS NEEDED
Status: DISCONTINUED | OUTPATIENT
Start: 2025-02-24 | End: 2025-02-24 | Stop reason: HOSPADM

## 2025-02-24 RX ORDER — GLYCOPYRROLATE 0.2 MG/ML
INJECTION INTRAMUSCULAR; INTRAVENOUS AS NEEDED
Status: DISCONTINUED | OUTPATIENT
Start: 2025-02-24 | End: 2025-02-24

## 2025-02-24 RX ORDER — MIDAZOLAM HYDROCHLORIDE 1 MG/ML
1 INJECTION, SOLUTION INTRAMUSCULAR; INTRAVENOUS ONCE AS NEEDED
Status: DISCONTINUED | OUTPATIENT
Start: 2025-02-24 | End: 2025-02-24 | Stop reason: HOSPADM

## 2025-02-24 RX ORDER — KETOROLAC TROMETHAMINE 30 MG/ML
INJECTION, SOLUTION INTRAMUSCULAR; INTRAVENOUS AS NEEDED
Status: DISCONTINUED | OUTPATIENT
Start: 2025-02-24 | End: 2025-02-24

## 2025-02-24 RX ORDER — CHLORHEXIDINE GLUCONATE ORAL RINSE 1.2 MG/ML
15 SOLUTION DENTAL AS NEEDED
Qty: 120 ML | Refills: 0 | Status: SHIPPED | OUTPATIENT
Start: 2025-02-24

## 2025-02-24 RX ORDER — LIDOCAINE HYDROCHLORIDE 10 MG/ML
0.1 INJECTION, SOLUTION EPIDURAL; INFILTRATION; INTRACAUDAL; PERINEURAL ONCE
Status: DISCONTINUED | OUTPATIENT
Start: 2025-02-24 | End: 2025-02-24 | Stop reason: HOSPADM

## 2025-02-24 RX ORDER — MIDAZOLAM HCL 2 MG/ML
20 SYRUP ORAL ONCE
Status: COMPLETED | OUTPATIENT
Start: 2025-02-24 | End: 2025-02-24

## 2025-02-24 RX ORDER — ROCURONIUM BROMIDE 10 MG/ML
INJECTION, SOLUTION INTRAVENOUS AS NEEDED
Status: DISCONTINUED | OUTPATIENT
Start: 2025-02-24 | End: 2025-02-24

## 2025-02-24 RX ORDER — OXYCODONE AND ACETAMINOPHEN 5; 325 MG/1; MG/1
1 TABLET ORAL EVERY 4 HOURS PRN
Status: DISCONTINUED | OUTPATIENT
Start: 2025-02-24 | End: 2025-02-24 | Stop reason: HOSPADM

## 2025-02-24 RX ORDER — ACETAMINOPHEN 500 MG
1000 TABLET ORAL EVERY 6 HOURS PRN
Qty: 30 TABLET | Refills: 0 | Status: SHIPPED | OUTPATIENT
Start: 2025-02-24

## 2025-02-24 RX ORDER — LIDOCAINE HYDROCHLORIDE AND EPINEPHRINE BITARTRATE 20; .01 MG/ML; MG/ML
INJECTION, SOLUTION SUBCUTANEOUS AS NEEDED
Status: DISCONTINUED | OUTPATIENT
Start: 2025-02-24 | End: 2025-02-24 | Stop reason: HOSPADM

## 2025-02-24 RX ORDER — AMOXICILLIN 500 MG/1
500 CAPSULE ORAL EVERY 8 HOURS SCHEDULED
Qty: 15 CAPSULE | Refills: 0 | Status: SHIPPED | OUTPATIENT
Start: 2025-02-24 | End: 2025-03-01

## 2025-02-24 RX ORDER — METOCLOPRAMIDE HYDROCHLORIDE 5 MG/ML
10 INJECTION INTRAMUSCULAR; INTRAVENOUS ONCE AS NEEDED
Status: DISCONTINUED | OUTPATIENT
Start: 2025-02-24 | End: 2025-02-24 | Stop reason: HOSPADM

## 2025-02-24 RX ORDER — LIDOCAINE HYDROCHLORIDE 20 MG/ML
INJECTION, SOLUTION INFILTRATION; PERINEURAL AS NEEDED
Status: DISCONTINUED | OUTPATIENT
Start: 2025-02-24 | End: 2025-02-24

## 2025-02-24 RX ORDER — ONDANSETRON HYDROCHLORIDE 2 MG/ML
INJECTION, SOLUTION INTRAVENOUS AS NEEDED
Status: DISCONTINUED | OUTPATIENT
Start: 2025-02-24 | End: 2025-02-24

## 2025-02-24 RX ORDER — TRAMADOL HYDROCHLORIDE 50 MG/1
50 TABLET ORAL EVERY 6 HOURS PRN
Qty: 15 TABLET | Refills: 0 | Status: SHIPPED | OUTPATIENT
Start: 2025-02-24 | End: 2025-03-01

## 2025-02-24 RX ORDER — MORPHINE SULFATE 2 MG/ML
INJECTION, SOLUTION INTRAMUSCULAR; INTRAVENOUS AS NEEDED
Status: DISCONTINUED | OUTPATIENT
Start: 2025-02-24 | End: 2025-02-24

## 2025-02-24 RX ORDER — SODIUM CHLORIDE, SODIUM LACTATE, POTASSIUM CHLORIDE, CALCIUM CHLORIDE 600; 310; 30; 20 MG/100ML; MG/100ML; MG/100ML; MG/100ML
100 INJECTION, SOLUTION INTRAVENOUS CONTINUOUS
Status: DISCONTINUED | OUTPATIENT
Start: 2025-02-24 | End: 2025-02-24 | Stop reason: HOSPADM

## 2025-02-24 RX ORDER — CEFAZOLIN 1 G/1
INJECTION, POWDER, FOR SOLUTION INTRAVENOUS AS NEEDED
Status: DISCONTINUED | OUTPATIENT
Start: 2025-02-24 | End: 2025-02-24

## 2025-02-24 RX ADMIN — CEFAZOLIN 2 G: 1 INJECTION, POWDER, FOR SOLUTION INTRAMUSCULAR; INTRAVENOUS at 08:22

## 2025-02-24 RX ADMIN — KETOROLAC TROMETHAMINE 15 MG: 30 INJECTION, SOLUTION INTRAMUSCULAR; INTRAVENOUS at 08:56

## 2025-02-24 RX ADMIN — DEXAMETHASONE SODIUM PHOSPHATE 10 MG: 4 INJECTION, SOLUTION INTRA-ARTICULAR; INTRALESIONAL; INTRAMUSCULAR; INTRAVENOUS; SOFT TISSUE at 08:21

## 2025-02-24 RX ADMIN — SODIUM CHLORIDE, POTASSIUM CHLORIDE, SODIUM LACTATE AND CALCIUM CHLORIDE: 600; 310; 30; 20 INJECTION, SOLUTION INTRAVENOUS at 08:09

## 2025-02-24 RX ADMIN — GLYCOPYRROLATE 0.1 MG: 0.2 INJECTION INTRAMUSCULAR; INTRAVENOUS at 08:17

## 2025-02-24 RX ADMIN — MIDAZOLAM HYDROCHLORIDE 20 MG: 2 SYRUP ORAL at 07:18

## 2025-02-24 RX ADMIN — ONDANSETRON 4 MG: 2 INJECTION INTRAMUSCULAR; INTRAVENOUS at 08:56

## 2025-02-24 RX ADMIN — MORPHINE SULFATE 4 MG: 2 INJECTION, SOLUTION INTRAMUSCULAR; INTRAVENOUS at 08:09

## 2025-02-24 RX ADMIN — ROCURONIUM BROMIDE 40 MG: 10 SOLUTION INTRAVENOUS at 08:10

## 2025-02-24 ASSESSMENT — PAIN SCALES - GENERAL
PAIN_LEVEL: 0
PAINLEVEL_OUTOF10: 0 - NO PAIN

## 2025-02-24 ASSESSMENT — PAIN - FUNCTIONAL ASSESSMENT
PAIN_FUNCTIONAL_ASSESSMENT: FLACC (FACE, LEGS, ACTIVITY, CRY, CONSOLABILITY)
PAIN_FUNCTIONAL_ASSESSMENT: 0-10
PAIN_FUNCTIONAL_ASSESSMENT: FLACC (FACE, LEGS, ACTIVITY, CRY, CONSOLABILITY)

## 2025-02-24 ASSESSMENT — COLUMBIA-SUICIDE SEVERITY RATING SCALE - C-SSRS
1. IN THE PAST MONTH, HAVE YOU WISHED YOU WERE DEAD OR WISHED YOU COULD GO TO SLEEP AND NOT WAKE UP?: NO
6. HAVE YOU EVER DONE ANYTHING, STARTED TO DO ANYTHING, OR PREPARED TO DO ANYTHING TO END YOUR LIFE?: NO
2. HAVE YOU ACTUALLY HAD ANY THOUGHTS OF KILLING YOURSELF?: NO

## 2025-02-24 NOTE — ANESTHESIA PROCEDURE NOTES
Peripheral IV  Date/Time: 2/24/2025 8:09 AM      Placement  Needle size: 20 G  Laterality: left  Location: hand  Local anesthetic: none  Site prep: alcohol  Technique: anatomical landmarks  Attempts: 1

## 2025-02-24 NOTE — ANESTHESIA POSTPROCEDURE EVALUATION
Patient: Jamar Abdalla    Procedure Summary       Date: 02/24/25 Room / Location: [facility] 01 / Virtual [facility]    Anesthesia Start: 0804 Anesthesia Stop: 0910    Procedure: EXTRACTION, TOOTH (Mouth) Diagnosis:       Tooth impaction      (Impacted Teeth [K01.1])    Surgeons: Valentino Morales DDS Responsible Provider: Kavin Morales MD    Anesthesia Type: general ASA Status: 2            Anesthesia Type: general    Vitals Value Taken Time   BP  02/24/25 0930   Temp  02/24/25 0930   Pulse  02/24/25 0930   Resp  02/24/25 0930   SpO2  02/24/25 0930     Vitals: stable    Anesthesia Post Evaluation    Patient location during evaluation: PACU  Patient participation: complete - patient participated  Level of consciousness: awake  Pain score: 0  Pain management: adequate  Airway patency: patent  Cardiovascular status: acceptable  Respiratory status: acceptable  Hydration status: acceptable  Postoperative Nausea and Vomiting: none        There were no known notable events for this encounter.

## 2025-02-24 NOTE — H&P
History Of Present Illness  Jamar Abdalla is a 19 y.o. male presenting for extractions of teeth #1,16,17,32. He states that he has been having difficulty with function on his right side due to pain. He reports a history of pain on his right side which he describes as shooting in quality, denies any history of pain on his left side. Denies any history of swelling, denies any history of foul taste in the mouth or drainage.      Past Medical History  Past Medical History:   Diagnosis Date    Anxiety     Attention-deficit hyperactivity disorder, combined type     ADHD (attention deficit hyperactivity disorder), combined type    Crushing injury of unspecified finger(s), initial encounter 03/24/2014    Crush injury to finger    Depression     Developmental delay     Dysphagia, unspecified 02/12/2016    Swallowing difficulty    Encounter for removal of sutures 06/12/2018    Encounter for removal of sutures    Encounter for routine child health examination without abnormal findings 09/28/2015    Well child visit    Gastroparesis 02/12/2016    Gastroparesis    Other feeding difficulties 08/08/2017    Feeding difficulty in child    Personal history of diseases of the blood and blood-forming organs and certain disorders involving the immune mechanism 01/12/2016    History of iron deficiency anemia    Personal history of diseases of the blood and blood-forming organs and certain disorders involving the immune mechanism     History of anemia    Personal history of other diseases of the respiratory system     History of pharyngitis    Personal history of other diseases of the respiratory system     History of streptococcal pharyngitis    Pervasive developmental disorder, unspecified     PDD (pervasive developmental disorder)       Surgical History  Past Surgical History:   Procedure Laterality Date    NO PAST SURGERIES          Social History  He reports that he has never smoked. He has never used smokeless tobacco. He reports  that he does not drink alcohol and does not use drugs.    Family History  Family History   Problem Relation Name Age of Onset    Bipolar disorder Mother Shakila Abdalla     Irritable bowel syndrome Mother Shakila Abdalla     Sleep apnea Mother Shakila Abdalla     Asthma Mother Shakila Abdalla     Cancer Mother Shakila Abdalla     Mental illness Mother Shakila Abdalla     Miscarriages / Stillbirths Mother Shakila Abdalla     Sarcoidosis Father Jordi Abdalla     Scoliosis Father Jordi Abdalla     Other (PITUITARY TUMOR) Father Jordi Abdalla     Atrial fibrillation Father Jordi Abdalla     Diabetes Father Jordi Abdalla     Heart disease Father Jordi Abdalla     Other (SUBSTANCE ABUSE) Brother Ben Abdalla     Drug abuse Brother Ben Abdalla     Diabetes Other GRANDFATHER     Anemia Other GRANDFATHER         Allergies  Egg, Sertraline, and Whey protein concentrate         Physical Exam  Constitutional: AAOX3, resting comfortably in bed  NEURO: Alert and oriented x3, no gross motor or sensory deficits.  CV: RRR  PULM: Breathing comfortably on RA  GI: Abd soft, nontender, nondistended,  Skin: Warm and dry. No rashes or lesions noted.  Eyes: PERRL, EOMI. clear sclera  Head/Face: CN V and VII intact b/l, TMJ exam WNL  Mouth: MMM, #31 soft tissue impacted, #32 not visible intraorally, #1,16,17 not erupted into oral cavity, occlusion stable, ALEJANDRA 40 mm, FOM soft and non-elevated   Extremities: JOHNSON  PSYCH: Appropriate mood and behavior      Last Recorded Vitals  There were no vitals taken for this visit.         Assessment/Plan   Assessment & Plan      Jamar Abdalla is a 19 y.o. male presenting for extractions of teeth #1,16,17,32. Plan is for the patient to undergo extractions of teeth #1,16,17,32 today (2/24) in the OR with Carmen Hudson. The patient will be discharged following the procedure and will not be admitted.     [place] [phone] with any issues/concerns. If any issue with contacting via pager, please contact  Tesfaye Gaston via Haiku.      2nd call to contact via Haiku is Jon Jaime  3rd call via Haiku is Henrique Gaston,  LIA    OMFS PGY-1

## 2025-02-24 NOTE — ANESTHESIA PROCEDURE NOTES
Airway  Date/Time: 2/24/2025 8:12 AM  Urgency: elective    Airway not difficult    Staffing  Performed: FARTUN   Authorized by: Kavin Morales MD    Performed by: FARTUN Loya  Patient location during procedure: OR    Indications and Patient Condition  Indications for airway management: anesthesia  Spontaneous Ventilation: absent  Sedation level: deep  Preoxygenated: yes  Patient position: sniffing  MILS maintained throughout  Mask difficulty assessment: 1 - vent by mask  Planned trial extubation    Final Airway Details  Final airway type: endotracheal airway      Successful airway: ETT  Cuffed: yes   Successful intubation technique: direct laryngoscopy  Facilitating devices/methods: intubating stylet  Endotracheal tube insertion site: right naris  Blade: Lorraine  Blade size: #4  ETT size (mm): 7.5  Cormack-Lehane Classification: grade I - full view of glottis  Placement verified by: chest auscultation and capnometry   Cuff volume (mL): 8  Measured from: nares  ETT to nares (cm): 27  Number of attempts at approach: 1

## 2025-02-24 NOTE — ANESTHESIA PREPROCEDURE EVALUATION
Patient: Jamar Abdalla    Procedure Information       Date/Time: 02/24/25 0730    Procedure: EXTRACTION, TOOTH (Bilateral: Mouth) - 26928- Anesthesia  Extractions-, #1, 16, 17, 32    [place] [facility] [place] [facility] [place] [facility]    Surgeons: Valentino Morales DDS            Relevant Problems   Neuro   (+) Autism spectrum disorder ([facility])   (+) Expressive language delay   (+) ANA (generalized anxiety disorder)   (+) PDD (pervasive developmental disorder) ([facility])      GI   (+) Esophageal reflux      Musculoskeletal   (+) Mild scoliosis       Clinical information reviewed:   Tobacco  Allergies  Meds   Med Hx  Surg Hx   Fam Hx  Soc Hx        NPO Detail:  NPO/Void Status  Date of Last Liquid: 02/23/25  Time of Last Liquid: 2000  Date of Last Solid: 02/23/25  Time of Last Solid: 2000         Physical Exam    Airway  Mallampati: I  TM distance: >3 FB  Neck ROM: full     Cardiovascular - normal exam     Dental - normal exam     Pulmonary - normal exam     Abdominal - normal exam  (+) obese             Anesthesia Plan    History of general anesthesia?: yes  History of complications of general anesthesia?: no    ASA 2     general   (With preop oral midazolam)  inhalational induction   Anesthetic plan and risks discussed with father and mother.    Plan discussed with CAA.

## 2025-02-24 NOTE — DISCHARGE INSTRUCTIONS
OMFS Discharge Instructions:    Procedure: Extractions #1,16,17,32  Surgeon: Dr. Valentino Morales    INCISION CARE:  - Intra-oral Incision Care: To care for your intra-oral incisions, please swish and spit with 15 mLs of peridex solution 3-4 times daily until follow-up. Use swabs to perform oral care.     HYGIENE  - Do not brush your teeth, rinse your mouth or spit for the first 24 hours  - May brush your teeth gently starting on day 2 after surgery  - If prescribed Chlorhexidine rinse, gently rinse and spit the medication three times per day. Do not swallow the medication.  - You may shower, do not allow direct water stream on your incisions  - Do not bath, soak, or shower until directed by your doctor    DIET  - Do not drink through a straw.  - Start first with clear liquids, and then gradually advance to soup, and soft foods.  - Avoiding eating foods that are spicy, hot, tough, or chrunchy until the surgical sites have healed.    MEDICATIONS  - Your new medications:   - Acetaminophen (Tylenol):  Take every 6 hours for the first 7 days  - Tramadol: Take every 8 hours as needed for severe pain  - Chlorhexidine (Peridex): Swish for 60 seconds 2 times daily for the first week, gently spit out, do not swallow  - Antibiotic Amoxicillin : Take every 8 hours for 5 days.      PAIN & ANTIBIOTCS  - Pain medication should be taken only during the time that you feel significant discomfort. If the pain is severe, the prescription medication can be used. However, if only mild discomfort is experienced, try to use a less potent, over the counter medication such as Advil (ibuprofen and/or Tylenol (Acetaminophen). These can be taken in crushed tablets or in liquid form.  - Antibiotics: If prescribed please take antibiotics at the appropriate interval as described on the bottle. Be sure not to miss any doses and take the medicine until it is gone.    FIRST DAY AFTER SURGERY  - Hygiene: Return to your normal brushing routine, being very  careful around surgery site(s)  - Avoid using full-strength over the counter mouthwashes for 2 weeks.  - Begin using a chlorhexidine or warm salt-water rinse (1/4 teaspoon salt in a glass of warm water) after meals for the first week.  - Pain and swelling is normal and expected, and may last for 10-14 days. Don´t be alarmed if the third day is the worst.  - Continue eating soft foods. You may begin to gradually return to your normal diet as tolerated. Avoid spicy foods and drinks for 2 weeks.    ACTIVITY  - Do not lift more than 15 pounds until directed by your doctor  - Do not perform intense physical activity until directed by your doctor  - Do not drive while taking narcotics  - Do not smoke    BLEEDING  - Change gauze as directed every 20-30 minutes until active bleeding has subsided (usually 2-3 hours).  - Make sure to apply good pressure to the gauze by biting down  - You may remove gauze to begin drinking, but return fresh gauze to surgery sites if bleeding is still present.  - It is normal to experience light bleeding or oozing for up to 24 hours. If there is severe bleeding follow instructions at the bottom of the form under ``Excessive Bleeding´´    PLEASE REPORT ANY OF THE FOLLOWING TO OUR TEAM:  - Sudden or excessive bleeding, swelling, or bruising.  - Any itching, rash, or adverse reaction to medication.  - Fever over 100 degrees Fahrenheit.  - Drainage or discharge from the incision sites (other than blood).  - Any injury to the face over the next 6 weeks.    If you have any questions or concerns please contact us during regular office hours ([phone]). For any emergency or after hours questions do not hesitate to contact the resident on call. You may call [phone] for the hospital  and ask for the ``Oral Surgeon On Call´´.

## 2025-02-24 NOTE — OP NOTE
EXTRACTION, TOOTH Operative Note     Date: 2025  OR Location: [facility] OR    Name: Jamar Abdalla, : 2005, Age: 19 y.o., MRN: [id number], Sex: male    Diagnosis  Pre-op Diagnosis      * Tooth impaction [K01.1] Post-op Diagnosis     * Tooth impaction [K01.1]     Procedures  EXTRACTION, TOOTH  21909 - UT UNLISTED PROCEDURE DENTOALVEOLAR STRUCTURES     (x4) - Extraction of complete bony impacted third molars    Surgeons      * Valentino Morales - Primary    Resident/Fellow/Other Assistant:  Henrique Moreland DMD, MD - Resident - Assisting  Tesfaye Gaston DMD - Resident - Assisting    Staff:   Circulator: Carol Ann Moralesub Person: Zaki    Anesthesia Staff: Anesthesiologist: Kavin Morales MD  C-AA: FARTUN Loya  RENNY: Princess Gibbs    Procedure Summary  Anesthesia: General  ASA: II  Estimated Blood Loss: 10mL  Intra-op Medications:   Administrations occurring from 0730 to 0900 on 25:   Medication Name Total Dose   lidocaine-epinephrine (XYLOCAINE W/EPI) 2 %-1:100,000 dental cartridge 6.8 mL   bupivacaine PF (Marcaine) 0.5 % (5 mg/mL) injection 8 mL   ceFAZolin (Ancef) 1 g 2 g   dexAMETHasone (Decadron) 4 mg/mL 10 mg   glycopyrrolate (Robinul) injection 0.1 mg   ketorolac (Toradol) 15 mg 15 mg   LR bolus Cannot be calculated   morphine 2 mg/mL 4 mg   ondansetron 2 mg/mL 4 mg   rocuronium (ZeMuron) 50 mg/5 mL injection 40 mg              Anesthesia Record               Intraprocedure I/O Totals          Intake    LR bolus 300.00 mL    Total Intake 300 mL            Findings: Complete bony impacted teeth #1, 16, 17, 32    Indications: Jamar Abdalla is an 19 y.o. male who is having surgery for Impacted Teeth [K01.1]. Jamar has a past medical history of autism and pervasive behavior disorder which prevents him from being a candidate for ambulatory sedation, he is indicated for extraction of his third molars in an OR setting in order to optimize his safety.    The patient was seen in the  preoperative area. The risks, benefits, complications, treatment options, non-operative alternatives, expected recovery and outcomes were discussed with the patient. The possibilities of reaction to medication, pulmonary aspiration, injury to surrounding structures, bleeding, recurrent infection, the need for additional procedures, failure to diagnose a condition, and creating a complication requiring transfusion or operation were discussed with the patient. The patient concurred with the proposed plan, giving informed consent.  The site of surgery was properly noted/marked if necessary per policy. The patient has been actively warmed in preoperative area. Preoperative antibiotics have been ordered and given within 1 hours of incision. Venous thrombosis prophylaxis are not indicated.    Procedure Details:   The patient was greeted in the pre-op holding area, where all preoperative risks and complications were reviewed. Later, the patient was brought into the operating room by the anesthesia staff and was placed in the supine position. A time out was performed to confirmed patient's identity and the procedure to be performed. The patient was then induced for general anesthesia and intubated with a nasal endotracheal tube. Following intubation, the endotracheal tube was secured by the surgical team. The patient was draped in standard oral and maxillofacial surgery fashion for dental extractions. Throat pack was placed and OR staff notified.    Bite block and sweetheart placed for retraction. 1% lidocaine with 1:100K epi was administered via inferior alveolar nerve blocks and local infiltration at the planned incision site. A preincision pause was performed. A 15 blade was then used to incise mucosa down to bone distobuccal to teeth #1, 16, 17, 32. Periosteal elevator used to elevate the flaps and expose the teeth. Rongeur and #9 periosteal were used to remove buccal bone at sites #1 and 16, the teeth were then  elevated and delivered with straight elevators and forceps. Extraction sites were irrigated with copious amount of normal saline. A 15 blade was then used to incise mucosa down to bone distobuccal to teeth #17 and 32. Periosteal elevator used to elevate the flaps and expose the teeth. A surgical handpiece with a fissure bur and copious irrigation was used to create a buccal trough and section #17 and 32. Delivery of individual roots. Extraction sites were irrigated with copious amount of normal saline. Sharp bony prominences were smoothed. A 3-0 chromic gut suture was placed to reapproximate the tissue of site #17 and 32. Throat pack was removed and OR staff notified. 8cc of 0.5% marcaine was then injected adjacent to surgical sites.    Care of the patient was then turned over to the anesthesia team. The patient was emerged from anesthesia, extubated and was taken to PACU in stable condition.     Dr. Morales was present for all critical portions of the case.       Complications:  None; patient tolerated the procedure well.    Disposition: PACU - hemodynamically stable.  Condition: stable       Additional Details: N/A    Attending Attestation:     Valentino Morales  Phone Number: [phone]

## 2025-02-25 ASSESSMENT — PAIN SCALES - GENERAL: PAINLEVEL_OUTOF10: 0 - NO PAIN

## 2025-03-17 ENCOUNTER — ANCILLARY PROCEDURE (OUTPATIENT)
Dept: URGENT CARE | Age: 20
End: 2025-03-17
Payer: COMMERCIAL

## 2025-03-17 ENCOUNTER — OFFICE VISIT (OUTPATIENT)
Dept: URGENT CARE | Age: 20
End: 2025-03-17
Payer: COMMERCIAL

## 2025-03-17 VITALS
HEART RATE: 101 BPM | BODY MASS INDEX: 28.21 KG/M2 | DIASTOLIC BLOOD PRESSURE: 72 MMHG | RESPIRATION RATE: 16 BRPM | OXYGEN SATURATION: 96 % | SYSTOLIC BLOOD PRESSURE: 102 MMHG | WEIGHT: 208 LBS | TEMPERATURE: 97.9 F

## 2025-03-17 DIAGNOSIS — S69.91XA HAND INJURY, RIGHT, INITIAL ENCOUNTER: ICD-10-CM

## 2025-03-17 DIAGNOSIS — S69.91XA WRIST INJURY, RIGHT, INITIAL ENCOUNTER: ICD-10-CM

## 2025-03-17 DIAGNOSIS — F41.1 GAD (GENERALIZED ANXIETY DISORDER): ICD-10-CM

## 2025-03-17 DIAGNOSIS — S60.221A CONTUSION OF RIGHT HAND, INITIAL ENCOUNTER: Primary | ICD-10-CM

## 2025-03-17 PROCEDURE — 73130 X-RAY EXAM OF HAND: CPT | Mod: RIGHT SIDE

## 2025-03-17 PROCEDURE — 1036F TOBACCO NON-USER: CPT

## 2025-03-17 PROCEDURE — 73110 X-RAY EXAM OF WRIST: CPT | Mod: RIGHT SIDE

## 2025-03-17 PROCEDURE — 99213 OFFICE O/P EST LOW 20 MIN: CPT

## 2025-03-17 RX ORDER — PREDNISONE 20 MG/1
20 TABLET ORAL DAILY
Qty: 5 TABLET | Refills: 0 | Status: SHIPPED | OUTPATIENT
Start: 2025-03-17 | End: 2025-03-22

## 2025-03-17 RX ORDER — HYDROXYZINE HYDROCHLORIDE 25 MG/1
25 TABLET, FILM COATED ORAL 3 TIMES DAILY PRN
Qty: 270 TABLET | Refills: 1 | Status: SHIPPED | OUTPATIENT
Start: 2025-03-17 | End: 2025-09-13

## 2025-03-17 ASSESSMENT — ENCOUNTER SYMPTOMS
MUSCULOSKELETAL NEGATIVE: 1
WOUND: 0
PSYCHIATRIC NEGATIVE: 1
CONSTITUTIONAL NEGATIVE: 1
FEVER: 0
NEUROLOGICAL NEGATIVE: 1
COLOR CHANGE: 0
EYES NEGATIVE: 1
JOINT SWELLING: 0
RESPIRATORY NEGATIVE: 1
MYALGIAS: 0
GASTROINTESTINAL NEGATIVE: 1
CARDIOVASCULAR NEGATIVE: 1
FATIGUE: 0
ARTHRALGIAS: 0

## 2025-03-17 NOTE — PROGRESS NOTES
Subjective   Patient ID: Jamar Abdalla is a 19 y.o. male. They present today with a chief complaint of Hand Injury (Hit wall, RT hand).    History of Present Illness  C/o R wrist and hand pain from hitting a wall x 21 day(s). They did not seek medical attention at that time. Has tried OTC meds with mild relief. 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 Denies any CP, SOB, HA, fever, abdominal pain, and nausea/vomiting otherwise.      History provided by:  Patient and parent (dad)  Hand Injury  Associated symptoms: no fatigue and no fever        Past Medical History  Allergies as of 03/17/2025 - Reviewed 03/17/2025   Allergen Reaction Noted    Egg Other 01/18/2024    Sertraline Other 06/02/2018    Whey protein concentrate Other 01/18/2024       (Not in a hospital admission)       Past Medical History:   Diagnosis Date    Anxiety     Attention-deficit hyperactivity disorder, combined type     ADHD (attention deficit hyperactivity disorder), combined type    Crushing injury of unspecified finger(s), initial encounter 03/24/2014    Crush injury to finger    Depression     Developmental delay     Dysphagia, unspecified 02/12/2016    Swallowing difficulty    Encounter for removal of sutures 06/12/2018    Encounter for removal of sutures    Encounter for routine child health examination without abnormal findings 09/28/2015    Well child visit    Gastroparesis 02/12/2016    Gastroparesis    Other feeding difficulties 08/08/2017    Feeding difficulty in child    Personal history of diseases of the blood and blood-forming organs and certain disorders involving the immune mechanism 01/12/2016    History of iron deficiency anemia    Personal history of diseases of the blood and blood-forming organs and certain disorders involving the immune mechanism     History of anemia    Personal history of other diseases of the respiratory system      History of pharyngitis    Personal history of other diseases of the respiratory system     History of streptococcal pharyngitis    Pervasive developmental disorder, unspecified     PDD (pervasive developmental disorder)       Past Surgical History:   Procedure Laterality Date    NO PAST SURGERIES          reports that he has never smoked. He has never used smokeless tobacco. He reports that he does not drink alcohol and does not use drugs.    Review of Systems  Review of Systems   Constitutional: Negative.  Negative for fatigue and fever.   HENT: Negative.     Eyes: Negative.    Respiratory: Negative.     Cardiovascular: Negative.    Gastrointestinal: Negative.    Musculoskeletal: Negative.  Negative for arthralgias, joint swelling and myalgias.   Skin: Negative.  Negative for color change and wound.   Neurological: Negative.    Psychiatric/Behavioral: Negative.     All other systems reviewed and are negative.                                 Objective    Vitals:    03/17/25 1718   BP: 102/72   Pulse: 101   Resp: 16   Temp: 36.6 °C (97.9 °F)   SpO2: 96%   Weight: 94.3 kg (208 lb)     No LMP for male patient.    Physical Exam  Vitals and nursing note reviewed.   Constitutional:       General: He is not in acute distress.     Appearance: Normal appearance. He is not ill-appearing, toxic-appearing or diaphoretic.   HENT:      Head: Normocephalic and atraumatic.      Right Ear: Tympanic membrane and ear canal normal.      Left Ear: Tympanic membrane and ear canal normal.      Nose: Nose normal.      Mouth/Throat:      Mouth: Mucous membranes are moist.      Pharynx: Oropharynx is clear.   Eyes:      Extraocular Movements: Extraocular movements intact.      Pupils: Pupils are equal, round, and reactive to light.   Cardiovascular:      Rate and Rhythm: Normal rate and regular rhythm.      Heart sounds: Normal heart sounds.   Pulmonary:      Effort: Pulmonary effort is normal.      Breath sounds: Normal breath sounds.    Abdominal:      General: Bowel sounds are normal.   Musculoskeletal:         General: Signs of injury present. No swelling, tenderness or deformity. Normal range of motion.      Right wrist: No swelling, deformity, lacerations, tenderness, bony tenderness, snuff box tenderness or crepitus. Normal range of motion. Normal pulse.      Left wrist: Normal.      Right hand: No swelling, lacerations, tenderness or bony tenderness. Normal range of motion. Normal strength. Normal sensation. Normal capillary refill. Normal pulse.      Left hand: Normal.   Skin:     General: Skin is warm and dry.   Neurological:      Mental Status: He is alert and oriented to person, place, and time.   Psychiatric:         Mood and Affect: Mood normal.         Behavior: Behavior normal.         Procedures    Point of Care Test & Imaging Results from this visit  No results found for this visit on 03/17/25.   XR hand right 3+ views    Result Date: 3/17/2025  Interpreted By:  Hitesh Cornell, STUDY: XR HAND RIGHT 3+ VIEWS; XR WRIST RIGHT 3+ VIEWS; ;  3/17/2025 6:10 pm; 3/17/2025 6:14 pm   INDICATION: Signs/Symptoms:impact to right hand/wrist.   ,S69.91XA Unspecified injury of right wrist, hand and finger(s), initial encounter   COMPARISON: None.   ACCESSION NUMBER(S): [id number]; [id number]   ORDERING CLINICIAN: DAV VILLANUEVA   FINDINGS: Multiple views of the right hand and wrist. Soft tissue swelling. No acute displaced fracture is detected. No posttraumatic malalignment. No significant ulnar variance. Chronic appearing ossicle at the base of the 3rd proximal phalanx. Chronic appearing sclerotic irregularity of the distal radius.       No acute osseous abnormality detected of the right hand and wrist.     MACRO: None   Signed by: Hitesh Cornell 3/17/2025 6:21 PM Dictation workstation:   [id number]    XR wrist right 3+ views    Result Date: 3/17/2025  Interpreted By:  Hitesh Cornell, STUDY: XR HAND RIGHT 3+ VIEWS; XR WRIST RIGHT 3+ VIEWS; ;   3/17/2025 6:10 pm; 3/17/2025 6:14 pm   INDICATION: Signs/Symptoms:impact to right hand/wrist.   ,S69.91XA Unspecified injury of right wrist, hand and finger(s), initial encounter   COMPARISON: None.   ACCESSION NUMBER(S): [id number]; [id number]   ORDERING CLINICIAN: DAV VILLANUEVA   FINDINGS: Multiple views of the right hand and wrist. Soft tissue swelling. No acute displaced fracture is detected. No posttraumatic malalignment. No significant ulnar variance. Chronic appearing ossicle at the base of the 3rd proximal phalanx. Chronic appearing sclerotic irregularity of the distal radius.       No acute osseous abnormality detected of the right hand and wrist.     MACRO: None   Signed by: Hitesh Cornell 3/17/2025 6:21 PM Dictation workstation:   [id number]     Diagnostic study results (if any) were reviewed by MONIQUE Webb.    Assessment/Plan   Allergies, medications, history, and pertinent labs/EKGs/Imaging reviewed by MONIQUE Webb.     Medical Decision Making  XR of R hand/wrist neg for fracture, final. See results. ROM normal. Contusion of R wrist/hand. Sending prednisone course. Discussed rest, OTC symptoms management PRN. Activity as tolerated. Declined wrist brace for support/comfort. Dad and patient verbalized understanding and agreed with the plan of care.      At time of discharge, patient was clinically well-appearing and appropriate for outpatient management. The patient/parent/guardian was educated regarding diagnosis, supportive care, OTC and Rx medications. The patient/parent/guardian was given the opportunity to ask questions prior to discharge. They verbalized understanding of discussion of treatment plan, expected course of illness and/or injury, indications on when to return to , when to seek further evaluation in ED/call 911, and the need to follow up with PCP and/or specialist as referred. Patient/parent/guardian was provided with work/school documentation if  requested. Patient stable upon discharge.      Orders and Diagnoses  Diagnoses and all orders for this visit:  Contusion of right hand, initial encounter  -     predniSONE (Deltasone) 20 mg tablet; Take 1 tablet (20 mg) by mouth once daily for 5 days.  Hand injury, right, initial encounter  -     XR hand right 3+ views; Future  Wrist injury, right, initial encounter  -     XR wrist right 3+ views; Future      Medical Admin Record      Patient disposition: Home    Electronically signed by MONIQUE Webb  6:41 PM

## 2025-03-20 ENCOUNTER — APPOINTMENT (OUTPATIENT)
Dept: PRIMARY CARE | Facility: CLINIC | Age: 20
End: 2025-03-20
Payer: COMMERCIAL

## 2025-03-20 VITALS
BODY MASS INDEX: 29.55 KG/M2 | SYSTOLIC BLOOD PRESSURE: 118 MMHG | DIASTOLIC BLOOD PRESSURE: 74 MMHG | OXYGEN SATURATION: 99 % | HEIGHT: 72 IN | HEART RATE: 80 BPM | WEIGHT: 218.2 LBS | TEMPERATURE: 98 F

## 2025-03-20 DIAGNOSIS — R46.89 AGGRESSION: ICD-10-CM

## 2025-03-20 DIAGNOSIS — F90.9 ATTENTION DEFICIT HYPERACTIVITY DISORDER (ADHD), UNSPECIFIED ADHD TYPE: ICD-10-CM

## 2025-03-20 DIAGNOSIS — E66.3 OVERWEIGHT (BMI 25.0-29.9): ICD-10-CM

## 2025-03-20 DIAGNOSIS — F41.1 GAD (GENERALIZED ANXIETY DISORDER): ICD-10-CM

## 2025-03-20 DIAGNOSIS — F84.0 AUTISM SPECTRUM DISORDER (HHS-HCC): ICD-10-CM

## 2025-03-20 DIAGNOSIS — F84.9 PDD (PERVASIVE DEVELOPMENTAL DISORDER) (HHS-HCC): ICD-10-CM

## 2025-03-20 DIAGNOSIS — Z00.00 ANNUAL WELLNESS VISIT: Primary | ICD-10-CM

## 2025-03-20 DIAGNOSIS — K21.9 GASTROESOPHAGEAL REFLUX DISEASE WITHOUT ESOPHAGITIS: ICD-10-CM

## 2025-03-20 PROCEDURE — 1036F TOBACCO NON-USER: CPT | Performed by: FAMILY MEDICINE

## 2025-03-20 PROCEDURE — 3008F BODY MASS INDEX DOCD: CPT | Performed by: FAMILY MEDICINE

## 2025-03-20 PROCEDURE — 99395 PREV VISIT EST AGE 18-39: CPT | Performed by: FAMILY MEDICINE

## 2025-03-20 ASSESSMENT — PATIENT HEALTH QUESTIONNAIRE - PHQ9
SUM OF ALL RESPONSES TO PHQ9 QUESTIONS 1 AND 2: 0
2. FEELING DOWN, DEPRESSED OR HOPELESS: NOT AT ALL
1. LITTLE INTEREST OR PLEASURE IN DOING THINGS: NOT AT ALL

## 2025-03-20 NOTE — PROGRESS NOTES
Subjective   Chief complaint: Jamar Abdalla is a 19 y.o. male who presents for Annual Exam.    HPI:  HPI  Annual wellness.  Accompanied by dad.  Continues to follow with behavioral pediatrics.  Active problems are noted.  Recent follow-up was noted.  Medications were adjusted.  However he had increasing symptoms so they put him back on his previous medications.  He is also on metformin.  Tolerating once daily.  No gastrointestinal symptoms.  Weight has been stable.  Will continue for now.  Annual wellness labs are ordered.  His other active problems are noted.  He did have an episode where he had his wisdom teeth out.  Following the anesthesia he states he punched a wall.  Was seen at the urgent care.  X-ray was normal.  No fracture.  Was placed on prednisone.  Tolerating.  Swelling improving.  Otherwise general medical follow-up is noted lifestyle modifications reinforced physical activity as tolerated healthy diet medication compliance encouraged.  Avoidance of tobacco excessive alcohol or illegal substances encouraged.  Safety measures reinforced.  Vaccinations reviewed.  Follow-up in 1 clinical course and test results per  Objective   /74 (BP Location: Left arm, Patient Position: Sitting)   Pulse 80   Temp 36.7 °C (98 °F) (Temporal)   Ht 1.829 m (6')   Wt 99 kg (218 lb 3.2 oz)   SpO2 99% Comment: ra  BMI 29.59 kg/m²   Physical Exam  Vitals and nursing note reviewed.   Constitutional:       Appearance: Normal appearance.   HENT:      Head: Normocephalic and atraumatic.   Eyes:      Extraocular Movements: Extraocular movements intact.      Conjunctiva/sclera: Conjunctivae normal.      Pupils: Pupils are equal, round, and reactive to light.   Cardiovascular:      Rate and Rhythm: Normal rate and regular rhythm.      Heart sounds: Normal heart sounds.   Pulmonary:      Effort: Pulmonary effort is normal.      Breath sounds: Normal breath sounds.   Abdominal:      General: Abdomen is flat. Bowel sounds  are normal.      Palpations: Abdomen is soft.   Musculoskeletal:         General: Normal range of motion.   Skin:     General: Skin is warm and dry.   Neurological:      General: No focal deficit present.      Mental Status: He is alert and oriented to person, place, and time. Mental status is at baseline.   Psychiatric:         Mood and Affect: Mood normal.         Behavior: Behavior normal.       Review of Systems   I have reviewed and reconciled the medication list with the patient today.   Current Outpatient Medications:     acetaminophen (Tylenol) 500 mg tablet, Take 2 tablets (1,000 mg) by mouth every 6 hours if needed for mild pain (1 - 3)., Disp: 30 tablet, Rfl: 0    busPIRone (Buspar) 30 mg tablet, Take 0.5 tablets (15 mg) by mouth 3 times a day., Disp: 45 tablet, Rfl: 3    chlorhexidine (Peridex) 0.12 % solution, Use 15 mL in the mouth or throat if needed for wound care., Disp: 120 mL, Rfl: 0    ferrous sulfate, 325 mg ferrous sulfate, tablet, Take 1 tablet (325 mg) by mouth once daily with breakfast., Disp: , Rfl:     hydrOXYzine HCL (Atarax) 10 mg tablet, TAKE 1 TABLET 2-3 TIMES DAILY AS NEEDED FOR PANIC, Disp: 270 tablet, Rfl: 1    hydrOXYzine HCL (Atarax) 25 mg tablet, TAKE 1 TABLET (25 MG) BY MOUTH 3 TIMES A DAY AS NEEDED FOR ANXIETY., Disp: 270 tablet, Rfl: 1    metFORMIN (Glucophage) 500 mg tablet, Take 1 tablet (500 mg) by mouth once daily in the evening. Take with meals. Take with food., Disp: 30 tablet, Rfl: 3    omeprazole (PriLOSEC) 20 mg DR capsule, TAKE 1 CAPSULE BY MOUTH EVERY DAY, Disp: 90 capsule, Rfl: 1    predniSONE (Deltasone) 20 mg tablet, Take 1 tablet (20 mg) by mouth once daily for 5 days., Disp: 5 tablet, Rfl: 0    risperiDONE (RisperDAL) 1 mg tablet, Take 1 tablet (1 mg) by mouth 2 times a day., Disp: 180 tablet, Rfl: 1     Imaging:  XR hand right 3+ views    Result Date: 3/17/2025  Interpreted By:  Hitesh Cornell, STUDY: XR HAND RIGHT 3+ VIEWS; XR WRIST RIGHT 3+ VIEWS; ;   3/17/2025 6:10 pm; 3/17/2025 6:14 pm   INDICATION: Signs/Symptoms:impact to right hand/wrist.   ,S69.91XA Unspecified injury of right wrist, hand and finger(s), initial encounter   COMPARISON: None.   ACCESSION NUMBER(S): [id number]; [id number]   ORDERING CLINICIAN: DAV VILLANUEVA   FINDINGS: Multiple views of the right hand and wrist. Soft tissue swelling. No acute displaced fracture is detected. No posttraumatic malalignment. No significant ulnar variance. Chronic appearing ossicle at the base of the 3rd proximal phalanx. Chronic appearing sclerotic irregularity of the distal radius.       No acute osseous abnormality detected of the right hand and wrist.     MACRO: None   Signed by: Hitesh Cornell 3/17/2025 6:21 PM Dictation workstation:   [id number]    XR wrist right 3+ views    Result Date: 3/17/2025  Interpreted By:  Hitesh Cornell, STUDY: XR HAND RIGHT 3+ VIEWS; XR WRIST RIGHT 3+ VIEWS; ;  3/17/2025 6:10 pm; 3/17/2025 6:14 pm   INDICATION: Signs/Symptoms:impact to right hand/wrist.   ,S69.91XA Unspecified injury of right wrist, hand and finger(s), initial encounter   COMPARISON: None.   ACCESSION NUMBER(S): [id number]; [id number]   ORDERING CLINICIAN: DAV VILLANUEVA   FINDINGS: Multiple views of the right hand and wrist. Soft tissue swelling. No acute displaced fracture is detected. No posttraumatic malalignment. No significant ulnar variance. Chronic appearing ossicle at the base of the 3rd proximal phalanx. Chronic appearing sclerotic irregularity of the distal radius.       No acute osseous abnormality detected of the right hand and wrist.     MACRO: None   Signed by: Hitesh Cornell 3/17/2025 6:21 PM Dictation workstation:   [id number]       Labs reviewed:    Lab Results   Component Value Date    WBC 7.3 09/30/2024    HGB 13.3 (L) 09/30/2024    HCT 41.4 09/30/2024     09/30/2024    CHOL 181 09/30/2024    TRIG 135 09/30/2024    HDL 46.6 09/30/2024    ALT 26 09/30/2024    AST 23 09/30/2024      09/30/2024    K 4.3 09/30/2024     09/30/2024    CREATININE 0.83 09/30/2024    BUN 10 09/30/2024    CO2 25 09/30/2024    TSH 1.32 09/30/2024    GLUF 93 11/25/2022    HGBA1C 5.3 09/30/2024       Assessment/Plan   Problem List Items Addressed This Visit             ICD-10-CM    Autism spectrum disorder ([facility]) F84.0    ADHD (attention deficit hyperactivity disorder) F90.9    Aggression R46.89    Esophageal reflux K21.9    ANA (generalized anxiety disorder) F41.1    PDD (pervasive developmental disorder) ([facility]) F84.9     Other Visit Diagnoses         Codes    Annual wellness visit    -  Primary Z00.00    Relevant Orders    Comprehensive metabolic panel    Tsh With Reflex To Free T4 If Abnormal    Hemoglobin A1c    CBC    Lipid panel    Magnesium    Overweight (BMI 25.0-29.9)     E66.3    Relevant Orders    Comprehensive metabolic panel    Tsh With Reflex To Free T4 If Abnormal    Hemoglobin A1c    CBC    Lipid panel    Magnesium            Reinforced lifestyle modifications  Continue current medications as listed  Physical activity as tolerated and healthy diet encouraged  Maintain a healthy weight  Follow up in 1 year      Oriented - self; Oriented - place; Oriented - time

## 2025-03-31 ENCOUNTER — PATIENT MESSAGE (OUTPATIENT)
Dept: BEHAVIORAL HEALTH | Facility: CLINIC | Age: 20
End: 2025-03-31
Payer: COMMERCIAL

## 2025-03-31 DIAGNOSIS — F84.0 AUTISM SPECTRUM DISORDER (HHS-HCC): ICD-10-CM

## 2025-04-01 RX ORDER — RISPERIDONE 1 MG/1
1 TABLET ORAL 2 TIMES DAILY
Qty: 180 TABLET | Refills: 1 | Status: SHIPPED | OUTPATIENT
Start: 2025-04-01 | End: 2025-09-28

## 2025-05-25 ENCOUNTER — OFFICE VISIT (OUTPATIENT)
Dept: URGENT CARE | Age: 20
End: 2025-05-25
Payer: COMMERCIAL

## 2025-05-25 ENCOUNTER — ANCILLARY PROCEDURE (OUTPATIENT)
Dept: URGENT CARE | Age: 20
End: 2025-05-25
Payer: COMMERCIAL

## 2025-05-25 VITALS
HEIGHT: 72 IN | SYSTOLIC BLOOD PRESSURE: 117 MMHG | RESPIRATION RATE: 20 BRPM | DIASTOLIC BLOOD PRESSURE: 77 MMHG | TEMPERATURE: 98.1 F | OXYGEN SATURATION: 96 % | BODY MASS INDEX: 30.48 KG/M2 | WEIGHT: 225 LBS | HEART RATE: 89 BPM

## 2025-05-25 DIAGNOSIS — S99.921A TOE INJURY, RIGHT, INITIAL ENCOUNTER: Primary | ICD-10-CM

## 2025-05-25 DIAGNOSIS — S99.921A TOE INJURY, RIGHT, INITIAL ENCOUNTER: ICD-10-CM

## 2025-05-25 PROCEDURE — 1036F TOBACCO NON-USER: CPT | Performed by: NURSE PRACTITIONER

## 2025-05-25 PROCEDURE — 99213 OFFICE O/P EST LOW 20 MIN: CPT | Performed by: NURSE PRACTITIONER

## 2025-05-25 PROCEDURE — 3008F BODY MASS INDEX DOCD: CPT | Performed by: NURSE PRACTITIONER

## 2025-05-25 PROCEDURE — 73660 X-RAY EXAM OF TOE(S): CPT | Mod: RIGHT SIDE | Performed by: NURSE PRACTITIONER

## 2025-05-25 ASSESSMENT — ENCOUNTER SYMPTOMS: ARTHRALGIAS: 1

## 2025-05-25 ASSESSMENT — PAIN SCALES - GENERAL: PAINLEVEL_OUTOF10: 4

## 2025-05-25 NOTE — PROGRESS NOTES
Subjective   Patient ID: Jamar Abdalla is a 20 y.o. male. They present today with a chief complaint of Foot Injury.    History of Present Illness  Patient presents with concern for toe injury. States he kicked a chair, and went horse riding after that. Endorses no symptomatic treatment. Endorses no other injuries of concern.       Foot Injury         Past Medical History  Allergies as of 05/25/2025 - Reviewed 05/25/2025   Allergen Reaction Noted    Egg Other 01/18/2024    Sertraline Other 06/02/2018    Whey protein concentrate Other 01/18/2024       Prescriptions Prior to Admission[1]     Medical History[2]    Surgical History[3]     reports that he has never smoked. He has never used smokeless tobacco. He reports that he does not currently use alcohol. He reports that he does not use drugs.    Review of Systems  Review of Systems   Musculoskeletal:  Positive for arthralgias.                                  Objective    Vitals:    05/25/25 1633   BP: 117/77   Pulse: 89   Resp: 20   Temp: 36.7 °C (98.1 °F)   TempSrc: Oral   SpO2: 96%   Weight: 102 kg (225 lb)   Height: 1.829 m (6')     No LMP for male patient.    Physical Exam  Vitals reviewed.   Constitutional:       Appearance: Normal appearance.   Cardiovascular:      Rate and Rhythm: Normal rate.   Pulmonary:      Effort: Pulmonary effort is normal.   Musculoskeletal:         General: Tenderness present. No deformity.      Comments: 4th proximal phalanx   Skin:     General: Skin is warm and dry.      Findings: Erythema present.      Comments: 4th proximal phalanx   Neurological:      Mental Status: He is alert.         Procedures    Point of Care Test & Imaging Results from this visit  No results found for this visit on 05/25/25.   Imaging  No results found.    Cardiology, Vascular, and Other Imaging  No other imaging results found for the past 2 days      Diagnostic study results (if any) were reviewed by MONIQUE Enriquez.    Assessment/Plan    Allergies, medications, history, and pertinent labs/EKGs/Imaging reviewed by MONIQUE Enriquez.     Medical Decision Making  Advised of small avulsion fracture noted on xray. Advised bennie tape, ibuprofen, and ice. F/up with PCP if any further concerns.    At time of discharge patient was clinically well-appearing and HDS for outpatient management. The patient and/or family was educated regarding diagnosis, supportive care, OTC and Rx medications. The patient and/or family was given the opportunity to ask questions prior to discharge.  They verbalized understanding of my discussion of the plans for treatment, expected course, indications to return to  or seek further evaluation in ED, and the need for timely follow up as directed.   They were provided with a work/school excuse if requested.      Orders and Diagnoses  Diagnoses and all orders for this visit:  Toe injury, right, initial encounter  -     XR toe right 2+ views; Future      Medical Admin Record      Patient disposition: Home    Electronically signed by MONIQUE Enriquez  5:21 PM           [1] (Not in a hospital admission)   [2]   Past Medical History:  Diagnosis Date    Anxiety     Attention-deficit hyperactivity disorder, combined type     ADHD (attention deficit hyperactivity disorder), combined type    Crushing injury of unspecified finger(s), initial encounter 03/24/2014    Crush injury to finger    Depression     Developmental delay     Dysphagia, unspecified 02/12/2016    Swallowing difficulty    Encounter for removal of sutures 06/12/2018    Encounter for removal of sutures    Encounter for routine child health examination without abnormal findings 09/28/2015    Well child visit    Gastroparesis 02/12/2016    Gastroparesis    Other feeding difficulties 08/08/2017    Feeding difficulty in child    Personal history of diseases of the blood and blood-forming organs and certain disorders involving the immune mechanism 01/12/2016     History of iron deficiency anemia    Personal history of diseases of the blood and blood-forming organs and certain disorders involving the immune mechanism     History of anemia    Personal history of other diseases of the respiratory system     History of pharyngitis    Personal history of other diseases of the respiratory system     History of streptococcal pharyngitis    Pervasive developmental disorder, unspecified     PDD (pervasive developmental disorder)   [3]   Past Surgical History:  Procedure Laterality Date    NO PAST SURGERIES

## 2025-05-28 ENCOUNTER — APPOINTMENT (OUTPATIENT)
Dept: BEHAVIORAL HEALTH | Facility: CLINIC | Age: 20
End: 2025-05-28
Payer: COMMERCIAL

## 2025-05-28 DIAGNOSIS — F84.9 PERVASIVE DEVELOPMENTAL DISORDER (HHS-HCC): ICD-10-CM

## 2025-05-28 DIAGNOSIS — Q04.0: ICD-10-CM

## 2025-05-28 DIAGNOSIS — F41.1 GAD (GENERALIZED ANXIETY DISORDER): ICD-10-CM

## 2025-05-28 DIAGNOSIS — F84.0 AUTISM SPECTRUM DISORDER (HHS-HCC): ICD-10-CM

## 2025-05-28 PROCEDURE — 99214 OFFICE O/P EST MOD 30 MIN: CPT | Performed by: PSYCHIATRY & NEUROLOGY

## 2025-05-28 RX ORDER — BUSPIRONE HYDROCHLORIDE 30 MG/1
30 TABLET ORAL 2 TIMES DAILY
Qty: 180 TABLET | Refills: 1 | Status: SHIPPED | OUTPATIENT
Start: 2025-05-28 | End: 2025-05-28

## 2025-05-28 RX ORDER — RISPERIDONE 1 MG/1
1 TABLET ORAL 2 TIMES DAILY
Qty: 180 TABLET | Refills: 1 | Status: SHIPPED | OUTPATIENT
Start: 2025-05-28 | End: 2025-11-24

## 2025-05-28 RX ORDER — BUSPIRONE HYDROCHLORIDE 30 MG/1
30 TABLET ORAL 2 TIMES DAILY
Qty: 180 TABLET | Refills: 1 | Status: SHIPPED | OUTPATIENT
Start: 2025-05-28 | End: 2025-11-24

## 2025-05-28 NOTE — PROGRESS NOTES
"Outpatient Child and Adolescent Psychiatry      Subjective   Jamar Abdalla, a 20 y.o. male, for medication management follow up.  Patient seen virtually accompanied by father.    Chief Complaint:  Chief Complaint   Patient presents with    Autism    Anxiety        HPI:     Since last visit, Jamar reports he had his wisdom teeth removed. He had a negative reaction to Valium (diazepam), \"It was supposed to take the edge off, but it got him very agitated, very aggressive,\" per dad. Jamar states, \"There were 8 people holding me down.\" Surgery was rescheduled at a different facility for a later date. At that appointment, Jamar had to wait, \"I got so mad I punched a wall.\" The surgery ended up going well. When they decreased risperidone to 0.5 mg/1 mg following last visit, dad reports, He was more agitated, irritable,\" so they resumed 1 mg twice daily and things got better after about a week. Doing well at the [employer] and moved to a new barn. \"I love it.\" Getting along with others most of the time. Uses curse words, threatens violence while smiling in the course of standard conversation as a way to emphasize his point, but no actual intent to harm anyone. Likes his new PMD. Dad reports he's doing well overall. Seems to have a fear of heights because [place] becomes agitated on a pier, with tall buildings on the second floor of buildings. Jamar reports feeling anxious, uncomfortable, dizzy in these situations. He is rigid and remains anxious. Aside from robust appetite, no side effects. No SI or HI.      Wt: 218#  Ht: 71\"     Past med trials:  concerta: helpful but may have worsened irritability  Sertraline seemed helpful at first, but caused activation, aggression at higher doses  tenex worsened aggression and oppositionality  ritalin worked then stopped  clonidine \"knocked him out, he ran into walls and fell asleep on his feet\"  never tried seroquel, abilify     Depression: Denies   Appetite: robust  Sleep: " "unchanged  Anxiety: present  Rosanna: None  Attention: fair  Impulse control and behavioral concerns: see hpi  Trauma/Stressors: Denies  OCD: Denies  Perceptual disturbances and delusions: Denies  Substance use: Denies  Denies suicidal or homicidal ideations, plan or intent    There were no vitals filed for this visit.     Mental Status Exam:  Appearance: 20 y.o. male sitting comfortably in chair during interview. Casually dressed. Appropriate hygiene and grooming.  Behavior: Cooperative, talkative, intermittent eye contact. No abnormal motor activity observed.  Speech: Loud volume, difficult to interrupt, halting prosody. Uses swear words liberally.   Cognitive: Sustains attention and conversation throughout interview; grossly oriented to time, self, place, and situation; recent and remote recall are intact  Mood: “F--timothy good right now, I love my new barn\"  Affect: Euthymic, full range, somewhat irritable when describing having to wait for things  Though process: concrete  Thought Content: No suicidal ideation/intent/plan. No homicidal ideation/intent/plan.  Perception: Denies auditory and visual hallucinations. No internal stimulation observed. Reality testing is ostensibly intact during interview.  Insight: fair  Judgment: fair    Current Medications:  Current Medications[1]      Assessment/Plan   Diagnosis:  Problem List Items Addressed This Visit           ICD-10-CM    Autism spectrum disorder ([facility]) F84.0    ANA (generalized anxiety disorder) F41.1          Treatment Plan/Recommendations:     1) Discussed options and decided to increase buspirone to 15 mg/15 mg/30 mg daily for rigid thinking, anxiety related to heights  2) Continue higher dose of risperidone 1 mg twice daily for irritability, aggression since mood and agitation worsened on lower dose  3) Continue metformin 500 mg once daily (higher dose caused GI distress) and work on healthy eating  4) Continue hydroxyzine 25 mg twice daily as needed for " anxiety  5) Continue melatonin er 2.5-5mg as needed for insomnia, increase daytime exercise  6) Continue psychosocial supports, CSS supports, activities through [employer] and consider therapy for specific phobia around heights, reviewed labs, discussed eating whole foods, increasing daytime exercise, avoiding processed foods and animal products  7) Nice to see you and please come back in 3-4 months    Follow-up plan for psychiatric condition was discussed with patient and family  Take medication as prescribed; risks, benefits and alternatives of medication were explained, including but not limited to changes in mood, sleep, appetite, increased risks of suicidal ideations, etc. Family and patient verbalized understanding and provided verbal consent for treatment  Therapy: Continue therapy services  Call 911 or go to the nearest emergency room should suicidal ideations emerge  Patient instructed to call the office should new questions or concerns arise after office visit    Safety Risk Assessment:   Acute risk for harm to self/others: low  Chronic risk for harm to self/others: low    Problem List Items Addressed This Visit       Autism spectrum disorder ([facility])    ANA (generalized anxiety disorder)        Follow-up:    Dulce Baugh MD             [1]   Current Outpatient Medications:     acetaminophen (Tylenol) 500 mg tablet, Take 2 tablets (1,000 mg) by mouth every 6 hours if needed for mild pain (1 - 3)., Disp: 30 tablet, Rfl: 0    busPIRone (Buspar) 30 mg tablet, Take 0.5 tablets (15 mg) by mouth 3 times a day., Disp: 45 tablet, Rfl: 3    chlorhexidine (Peridex) 0.12 % solution, Use 15 mL in the mouth or throat if needed for wound care. (Patient not taking: Reported on 5/25/2025), Disp: 120 mL, Rfl: 0    ferrous sulfate, 325 mg ferrous sulfate, tablet, Take 1 tablet (325 mg) by mouth once daily with breakfast., Disp: , Rfl:     hydrOXYzine HCL (Atarax) 25 mg tablet, TAKE 1 TABLET (25 MG) BY MOUTH 3 TIMES  A DAY AS NEEDED FOR ANXIETY., Disp: 270 tablet, Rfl: 1    melatonin 10 mg tablet,chewable, Chew 5 mg once daily at bedtime., Disp: , Rfl:     metFORMIN (Glucophage) 500 mg tablet, Take 1 tablet (500 mg) by mouth once daily in the evening. Take with meals. Take with food., Disp: 30 tablet, Rfl: 3    omeprazole (PriLOSEC) 20 mg DR capsule, TAKE 1 CAPSULE BY MOUTH EVERY DAY, Disp: 90 capsule, Rfl: 1    risperiDONE (RisperDAL) 1 mg tablet, Take 1 tablet (1 mg) by mouth 2 times a day., Disp: 180 tablet, Rfl: 1

## 2025-06-15 DIAGNOSIS — T88.7XXA MEDICATION SIDE EFFECT: ICD-10-CM

## 2025-06-15 DIAGNOSIS — K21.9 GASTROESOPHAGEAL REFLUX DISEASE, UNSPECIFIED WHETHER ESOPHAGITIS PRESENT: ICD-10-CM

## 2025-06-16 ENCOUNTER — PATIENT MESSAGE (OUTPATIENT)
Dept: PRIMARY CARE | Facility: CLINIC | Age: 20
End: 2025-06-16
Payer: COMMERCIAL

## 2025-06-16 DIAGNOSIS — K21.9 GASTROESOPHAGEAL REFLUX DISEASE, UNSPECIFIED WHETHER ESOPHAGITIS PRESENT: ICD-10-CM

## 2025-06-16 RX ORDER — OMEPRAZOLE 20 MG/1
20 CAPSULE, DELAYED RELEASE ORAL DAILY
Qty: 90 CAPSULE | Refills: 1 | Status: SHIPPED | OUTPATIENT
Start: 2025-06-16 | End: 2025-06-16 | Stop reason: SDUPTHER

## 2025-06-16 RX ORDER — OMEPRAZOLE 20 MG/1
20 CAPSULE, DELAYED RELEASE ORAL DAILY
Qty: 90 CAPSULE | Refills: 1 | Status: SHIPPED | OUTPATIENT
Start: 2025-06-16

## 2025-06-16 RX ORDER — METFORMIN HYDROCHLORIDE 500 MG/1
500 TABLET ORAL
Qty: 90 TABLET | Refills: 1 | Status: SHIPPED | OUTPATIENT
Start: 2025-06-16 | End: 2025-12-13

## 2025-08-06 ENCOUNTER — APPOINTMENT (OUTPATIENT)
Dept: BEHAVIORAL HEALTH | Facility: CLINIC | Age: 20
End: 2025-08-06
Payer: COMMERCIAL

## 2025-08-06 DIAGNOSIS — F84.0 AUTISM SPECTRUM DISORDER (HHS-HCC): ICD-10-CM

## 2025-08-06 DIAGNOSIS — F41.1 GAD (GENERALIZED ANXIETY DISORDER): ICD-10-CM

## 2025-08-06 DIAGNOSIS — T88.7XXA MEDICATION SIDE EFFECT: ICD-10-CM

## 2025-08-06 PROCEDURE — 99214 OFFICE O/P EST MOD 30 MIN: CPT | Performed by: PSYCHIATRY & NEUROLOGY

## 2025-08-06 RX ORDER — METFORMIN HYDROCHLORIDE 500 MG/1
500 TABLET ORAL
Qty: 90 TABLET | Refills: 1 | Status: SHIPPED | OUTPATIENT
Start: 2025-08-06 | End: 2026-02-02

## 2025-08-06 RX ORDER — BUSPIRONE HYDROCHLORIDE 30 MG/1
15 TABLET ORAL 3 TIMES DAILY
Qty: 135 TABLET | Refills: 1 | Status: SHIPPED | OUTPATIENT
Start: 2025-08-06 | End: 2026-02-02

## 2025-08-06 RX ORDER — RISPERIDONE 1 MG/1
1 TABLET ORAL 2 TIMES DAILY
Qty: 180 TABLET | Refills: 1 | Status: SHIPPED | OUTPATIENT
Start: 2025-08-06 | End: 2026-02-02

## 2025-08-06 RX ORDER — MELATONIN 10 MG
CAPSULE ORAL
Qty: 180 CAPSULE | Refills: 1 | Status: SHIPPED | OUTPATIENT
Start: 2025-08-06

## 2025-08-06 RX ORDER — HYDROXYZINE PAMOATE 25 MG/1
25 CAPSULE ORAL 3 TIMES DAILY PRN
Qty: 270 CAPSULE | Refills: 1 | Status: SHIPPED | OUTPATIENT
Start: 2025-08-06 | End: 2026-02-02
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
## 2025-08-08 DIAGNOSIS — F41.1 GAD (GENERALIZED ANXIETY DISORDER): ICD-10-CM

## 2025-08-08 RX ORDER — ACETAMINOPHEN 500 MG
5 TABLET ORAL NIGHTLY
Qty: 30 TABLET | Refills: 3 | Status: SHIPPED | OUTPATIENT
Start: 2025-08-08

## 2025-11-05 ENCOUNTER — APPOINTMENT (OUTPATIENT)
Dept: BEHAVIORAL HEALTH | Facility: CLINIC | Age: 20
End: 2025-11-05
Payer: COMMERCIAL

## 2026-03-23 ENCOUNTER — APPOINTMENT (OUTPATIENT)
Dept: PRIMARY CARE | Facility: CLINIC | Age: 21
End: 2026-03-23
Payer: COMMERCIAL

## (undated) DEVICE — SPONGE, GAUZE, XRAY DECT, 16 PLY, 4 X 4, W/MASTER DMT,STERILE

## (undated) DEVICE — TUBING, SUCTION, CONNECTING, STERILE 0.25 X 120 IN., LF

## (undated) DEVICE — CONTAINER, SPECIMEN, 120 ML, STERILE

## (undated) DEVICE — SUTURE, CHROMIC, 3-0, 27 IN, PS2, BROWN

## (undated) DEVICE — Device

## (undated) DEVICE — COVER, MAYO STAND, W/PAD, 23 IN, DISPOSABLE, PLASTIC, LF, STERILE

## (undated) DEVICE — GLOVE, SURGICAL, PROTEXIS PI , 7.5, PF, LF

## (undated) DEVICE — SUTURE, SILK, 2-0, 30 IN, SH, CONTROL RELEASE, MULTIPACK, BLACK

## (undated) DEVICE — BLANKET, HYPERTHERMIA, LOWER BODY, BAIR HUGGER, ADULT

## (undated) DEVICE — SPONGE, LAP, XRAY DECT, 4IN X 18IN, W/MASTER DMT, STERILE

## (undated) DEVICE — SYRINGE, 60 CC, IRRIGATION, BULB, CONTRO-BULB, PAPER POUCH

## (undated) DEVICE — DRAPE, SHEET, THREE QUARTER, FAN FOLD, 57 X 77 IN

## (undated) DEVICE — SYRINGE, 10CC, CONTROL, STERILE

## (undated) DEVICE — REST, HEAD, BAGEL, 9 IN